# Patient Record
Sex: FEMALE | Race: ASIAN | NOT HISPANIC OR LATINO | Employment: PART TIME | URBAN - METROPOLITAN AREA
[De-identification: names, ages, dates, MRNs, and addresses within clinical notes are randomized per-mention and may not be internally consistent; named-entity substitution may affect disease eponyms.]

---

## 2022-06-21 ENCOUNTER — OFFICE VISIT (OUTPATIENT)
Dept: OBGYN | Facility: CLINIC | Age: 27
End: 2022-06-21
Payer: COMMERCIAL

## 2022-06-21 VITALS — HEART RATE: 68 BPM | WEIGHT: 195.2 LBS | SYSTOLIC BLOOD PRESSURE: 120 MMHG | DIASTOLIC BLOOD PRESSURE: 79 MMHG

## 2022-06-21 DIAGNOSIS — R87.610 ASCUS WITH POSITIVE HIGH RISK HPV CERVICAL: Primary | ICD-10-CM

## 2022-06-21 DIAGNOSIS — Z32.00 PREGNANCY EXAMINATION OR TEST, PREGNANCY UNCONFIRMED: ICD-10-CM

## 2022-06-21 DIAGNOSIS — R87.810 ASCUS WITH POSITIVE HIGH RISK HPV CERVICAL: Primary | ICD-10-CM

## 2022-06-21 LAB — HCG UR QL: NEGATIVE

## 2022-06-21 PROCEDURE — 81025 URINE PREGNANCY TEST: CPT | Performed by: OBSTETRICS & GYNECOLOGY

## 2022-06-21 PROCEDURE — 57454 BX/CURETT OF CERVIX W/SCOPE: CPT | Performed by: OBSTETRICS & GYNECOLOGY

## 2022-06-21 PROCEDURE — 88305 TISSUE EXAM BY PATHOLOGIST: CPT | Performed by: PATHOLOGY

## 2022-06-21 PROCEDURE — 88342 IMHCHEM/IMCYTCHM 1ST ANTB: CPT | Performed by: PATHOLOGY

## 2022-06-21 NOTE — PROGRESS NOTES
Subjective  Lance Disla is a 27 year old female here for a colposcopy.   Her last pap smear:  No results found for: PAP  Previous paps normal: yes as follows:  2/3/22 Other HR HPV positive, ASCUS at [facility]    Cervical risk factors:       Lifetime sexual partners: 5      Previous STD none      Previous dysplasia: no      Previous colposcopy: no.       Previous treatment:  none      Tobacco: No      Gardasil vaccination status: unknown  Current birth control: none  Patient's last menstrual period was 05/11/2022.  I discussed the history of HPV and the risk of dysplasia/cancer.  I explained the indications for the colposcopy and the procedure in detail.  I discussed the potential risks including bleeding, infection and cramping. I have recommend abstinence for 1 week and no vigorous activity for 48 hours.  Consent form was signed by patient and all questions were answered.    OBJECTIVE:  Vitals:    06/21/22 1444   BP: 120/79   Pulse: 68   Weight: 88.5 kg (195 lb 3.2 oz)      Patient alert, oriented, and in no acute distress. She was placed on the exam table in the dorsal position and a sterile speculum inserted into the vagina. The cervix was easily visualized.  Acetic acid was applied to better visualize the transformation zone.  Lugol's solution was then applied. Colposcopy was performed in the usual fashion.  One biopsy taken from the 1 o'clock position, and an ECC was performed. See procedure note and exam for further details.    ASSESSMENT:    Colposcopy of the cervix and upper/adjacent vagina with biopsy and ECC.       HPV OTher HR pos, ASCUS pap smear.    PLAN:   Will await pathology results, if CIN1, recommend Repeat pap in 12 months    Discussed symptoms to watch for, including bleeding through 1 pad or more per hour, heavy cramps or abdominal pain, fever, or purulent foul smelling discharge.  If these occur, then she will call or return for follow up.    Judy Reynolds, DO    Physical Exam   GYN:  Colposcopy/LEEP    Date/Time: 6/21/2022 2:55 PM  Performed by: Judy Reynolds DO  Authorized by: Judy Reynolds DO     Consent:     Consent obtained:  Written    Consent given by:  Patient    Procedural risks discussed:  Bleeding, damage to other organs, repeat procedure and infection    Patient questions answered: yes      Patient agrees, verbalizes understanding, and wants to proceed: yes      Educational handouts given: yes      Instructions and paperwork completed: yes    Universal protocol:     Patient states understanding of procedure being performed: yes      Relevant documents present and verified: yes      Test results available and properly labeled: yes      Required blood products, implants, devices, and special equipment available: yes    Pre-procedure:     Pre-procedure timeout performed: yes      Prepped with: acetic acid and Lugol    Indication:     Indication:  HPV and ASC-US    HPV Indications:  Other high risk  Procedure:     Procedure: Colposcopy w/ cervical biopsy and ECC      Under satisfactory analgesia the patient was prepped and draped in the dorsal lithotomy position: yes      Hollister speculum was placed in the vagina: yes      Under colposcopic examination the transition zone was seen in entirety: yes      Intracervical block was performed: no      Endocervix was curetted using a Kevorkian curette: yes      Tampon inserted: no      Monsel's solution was applied: yes      Biopsy(s): yes      Specimen to pathology: yes    Post-procedure:     Findings: Friable cervix and White epithelium      Impression: Low grade cervical dysplasia      Patient tolerance of procedure:  Patient tolerated the procedure well with no immediate complications

## 2022-06-21 NOTE — PATIENT INSTRUCTIONS
If you have any questions regarding your visit, Please contact your care team.    [employer] [facility]: [phone]      [facility] CLINIC HOURS TELEPHONE NUMBER   Judy Reynolds DO.    LYNNE Muñoz -Surgery Scheduler  Marti - Surgery Scheduler    BREE Ramesh, RN  BREE Long     Monday, Thursday  [place]  7am-3pm    Tuesday, Wednesday  [place]  7am-3pm    E/O Friday &   [place]    Typical Surgery Days: Thursday or Friday   [facility]  [address] [place] [address] [phone] Phone  [phone] Fax    [address] [address] [phone] Phone    Imaging [phone] Phone    [facility] Labor and Delivery:  [phone] Phone     **Surgeries** Our Surgery Schedulers will contact you to schedule. If you do not receive a call within 3 business days, please call [phone].    Urgent Care locations:  [facility] Saturday and    9 am - 5 pm    Monday-Friday   12 pm - 8 pm  Saturday and    9 am - 5 pm   [phone]       If you need a medication refill, please contact your pharmacy. Please allow 3 business days for your refill to be completed.  As always, Thank you for trusting us with your healthcare needs!

## 2022-06-24 LAB
PATH REPORT.COMMENTS IMP SPEC: NORMAL
PATH REPORT.FINAL DX SPEC: NORMAL
PATH REPORT.GROSS SPEC: NORMAL
PATH REPORT.MICROSCOPIC SPEC OTHER STN: NORMAL
PATH REPORT.MICROSCOPIC SPEC OTHER STN: NORMAL
PATH REPORT.RELEVANT HX SPEC: NORMAL
PHOTO IMAGE: NORMAL

## 2022-07-24 ENCOUNTER — HEALTH MAINTENANCE LETTER (OUTPATIENT)
Age: 27
End: 2022-07-24

## 2022-10-02 ENCOUNTER — HEALTH MAINTENANCE LETTER (OUTPATIENT)
Age: 27
End: 2022-10-02

## 2023-08-12 ENCOUNTER — HEALTH MAINTENANCE LETTER (OUTPATIENT)
Age: 28
End: 2023-08-12

## 2024-01-19 ENCOUNTER — VIRTUAL VISIT (OUTPATIENT)
Dept: FAMILY MEDICINE | Facility: CLINIC | Age: 29
End: 2024-01-19
Payer: COMMERCIAL

## 2024-01-19 DIAGNOSIS — N92.1 MENORRHAGIA WITH IRREGULAR CYCLE: Primary | ICD-10-CM

## 2024-01-19 PROCEDURE — 99203 OFFICE O/P NEW LOW 30 MIN: CPT | Mod: 95 | Performed by: NURSE PRACTITIONER

## 2024-01-19 ASSESSMENT — COLUMBIA-SUICIDE SEVERITY RATING SCALE - C-SSRS
1. WITHIN THE PAST MONTH, HAVE YOU WISHED YOU WERE DEAD OR WISHED YOU COULD GO TO SLEEP AND NOT WAKE UP?: NO
6. HAVE YOU EVER DONE ANYTHING, STARTED TO DO ANYTHING, OR PREPARED TO DO ANYTHING TO END YOUR LIFE?: NO
2. IN THE PAST MONTH, HAVE YOU ACTUALLY HAD ANY THOUGHTS OF KILLING YOURSELF?: NO

## 2024-01-19 ASSESSMENT — PATIENT HEALTH QUESTIONNAIRE - PHQ9
SUM OF ALL RESPONSES TO PHQ QUESTIONS 1-9: 15
10. IF YOU CHECKED OFF ANY PROBLEMS, HOW DIFFICULT HAVE THESE PROBLEMS MADE IT FOR YOU TO DO YOUR WORK, TAKE CARE OF THINGS AT HOME, OR GET ALONG WITH OTHER PEOPLE: SOMEWHAT DIFFICULT
SUM OF ALL RESPONSES TO PHQ QUESTIONS 1-9: 15

## 2024-01-19 NOTE — PROGRESS NOTES
Lance is a 28 year old who is being evaluated via a billable video visit.      How would you like to obtain your AVS? MyChart  If the video visit is dropped, the invitation should be resent by: Text to cell phone: [phone]  Will anyone else be joining your video visit? No      Assessment & Plan     Menorrhagia with irregular cycle  -Per chart review, patient was supposed to have colposcopy last year at [facility] due to having a positive HPV test on routine screening. Recommended she make an appointment with OBGYN for pap and pelvic exam due to abnormal uterine bleeding and to discuss fertility concerns.   -Schedule lab-only visit to have the following labs drawn:  - CBC with Platelets & Differential  - Ferritin  - Comprehensive metabolic panel  - Hemoglobin A1c  - TSH with free T4 reflex  - Lipid panel reflex to direct LDL Fasting  - Ob/Gyn  Referral  -Patient verbalized understanding and is agreement with plan of care.       Review of external notes as documented elsewhere in note  Review of the result(s) of each unique test - Pap, hpv  Ordering of each unique test        Depression Screening Follow Up        1/19/2024     7:47 AM   PHQ   PHQ-9 Total Score 15   Q9: Thoughts of better off dead/self-harm past 2 weeks More than half the days   F/U: Thoughts of suicide or self-harm Yes   F/U: Self harm-plan Yes   F/U: Self-harm action No   F/U: Safety concerns No         1/19/2024     7:47 AM   Last PHQ-9   1.  Little interest or pleasure in doing things 2   2.  Feeling down, depressed, or hopeless 2   3.  Trouble falling or staying asleep, or sleeping too much 3   4.  Feeling tired or having little energy 0   5.  Poor appetite or overeating 3   6.  Feeling bad about yourself 3   7.  Trouble concentrating 0   8.  Moving slowly or restless 0   Q9: Thoughts of better off dead/self-harm past 2 weeks 2   PHQ-9 Total Score 15   In the past two weeks have you had thoughts of suicide or self harm? Yes   Do  "you have concerns about your personal safety or the safety of others? No   In the past 2 weeks have you thought about a plan or had intention to harm yourself? Yes   In the past 2 weeks have you acted on these thoughts in any way? No             1/19/2024     4:14 PM   C-SSRS (Brief [place])   Within the last month, have you wished you were dead or wished you could go to sleep and not wake up? No   Within the last month, have you had any actual thoughts of killing yourself? No   Within the last month, have you ever done anything, started to do anything, or prepared to do anything to end your life? No         Follow Up        Follow Up Actions Taken  Patient to follow up with PCP.  Clinic staff to schedule appointment if able.    Discussed the following ways the patient can remain in a safe environment:  be around others  CONSULTATION/REFERRAL to OBGYN    Eliezer   Lance is a 28 year old, presenting for the following health issues:  Abnormal Bleeding Problem        1/19/2024     7:36 AM   Additional Questions   Roomed by Cy CLEMENT   Accompanied by Self     Abnormal Bleeding Problem    History of Present Illness       Reason for visit:  Irregular Menstrual cycles      Was on birth control [id number] for 3 years (Nexplanon). Since Nexplanon was removed she reports her periods have been very irregular, very heavy, soaking whole pad every couple hours with very large blood clots, bigger than quarter size. She reports her periods will sometimes skip 2 months and last any where from 2-7 days. She has been having some bleeding during and after intercourse as well. She states before she got Nexplanon her periods were regular every month, they were heavy but not as heavy as they are now.     Menarche: unsure, doesn't remember    Pregnant: has been pregnant 3 times, last pregnancy 2016. 2nd pregnancy was miscarriage. She has been trying to conceive since having Nexplanon removed.     Reports heigh and weight 5'4\" 206 lb    No " body acne  No facial hair or chest hair  Denies noticing darkening of the skin around her neck or under armpits      Menstrual Concern    Onset/Duration: Irregular menstrual cycles. Sometimes it goes on with heavy periods for 2 weeks and then wont have it for 2 months. Sometimes she misses work due to such  heavy periods because she is going through pads to quickly  Description:   Duration of bleeding episodes: Usually 6 or 7 days days  Frequency of periods: (1st day of one to 1st day of next):  N/A  Describe bleeding/flow:   Clots: YES- Large  Number of pads/day: 10        Cramping: mild and before  Accompanying Signs & Symptoms:  Lightheadedness: YES  Temperature intolerance: No  Nosebleeds/Easy bruising: No  Vaginal Discharge: YES- Prior to  Acne: No  Change in body hair: No  History:  Patient's last menstrual period was 01/02/2024 (exact date).  Previous normal periods: YES- ever since removal of nexplanon she has had irregular menstrual cycles  Contraceptive use: NO  Sexually active: YES  Any bleeding after intercourse: YES- Sometimes and usually after intercourse  Abnormal PAP Smears: YES  Precipitating or alleviating factors: Using tampons stops her menstrual cycle  Therapies tried and outcome: None        Review of Systems  Constitutional, HEENT, cardiovascular, pulmonary, GI, , musculoskeletal, neuro, skin, endocrine and psych systems are negative, except as otherwise noted.      Objective           Vitals:  No vitals were obtained today due to virtual visit.    Physical Exam   GENERAL: alert and no distress  EYES: Eyes grossly normal to inspection.  No discharge or erythema, or obvious scleral/conjunctival abnormalities.  RESP: No audible wheeze, cough, or visible cyanosis.    SKIN: Visible skin clear. No significant rash, abnormal pigmentation or lesions.  NEURO: Cranial nerves grossly intact.  Mentation and speech appropriate for age.  PSYCH: Appropriate affect, tone, and pace of words           Video-Visit Details    Type of service:  Video Visit   Video Start Time: 9:01 AM  Video End Time: 9:24    Originating Location (pt. Location): Home  Distant Location (provider location):  On-site  Platform used for Video Visit: Sary  Signed Electronically by: KELLY Dickinson CNP

## 2024-02-05 ENCOUNTER — OFFICE VISIT (OUTPATIENT)
Dept: OBGYN | Facility: CLINIC | Age: 29
End: 2024-02-05
Payer: COMMERCIAL

## 2024-02-05 VITALS — WEIGHT: 198.8 LBS | HEART RATE: 76 BPM | SYSTOLIC BLOOD PRESSURE: 114 MMHG | DIASTOLIC BLOOD PRESSURE: 75 MMHG

## 2024-02-05 DIAGNOSIS — N92.1 MENORRHAGIA WITH IRREGULAR CYCLE: ICD-10-CM

## 2024-02-05 DIAGNOSIS — R87.810 ASCUS WITH POSITIVE HIGH RISK HPV CERVICAL: Primary | ICD-10-CM

## 2024-02-05 DIAGNOSIS — R87.610 ASCUS WITH POSITIVE HIGH RISK HPV CERVICAL: Primary | ICD-10-CM

## 2024-02-05 DIAGNOSIS — N97.9 FEMALE INFERTILITY: ICD-10-CM

## 2024-02-05 LAB
ERYTHROCYTE [DISTWIDTH] IN BLOOD BY AUTOMATED COUNT: 16.8 % (ref 10–15)
HCT VFR BLD AUTO: 31.5 % (ref 35–47)
HGB BLD-MCNC: 9.2 G/DL (ref 11.7–15.7)
MCH RBC QN AUTO: 21.4 PG (ref 26.5–33)
MCHC RBC AUTO-ENTMCNC: 29.2 G/DL (ref 31.5–36.5)
MCV RBC AUTO: 73 FL (ref 78–100)
PLATELET # BLD AUTO: 277 10E3/UL (ref 150–450)
RBC # BLD AUTO: 4.3 10E6/UL (ref 3.8–5.2)
WBC # BLD AUTO: 7.5 10E3/UL (ref 4–11)

## 2024-02-05 PROCEDURE — 84403 ASSAY OF TOTAL TESTOSTERONE: CPT | Performed by: OBSTETRICS & GYNECOLOGY

## 2024-02-05 PROCEDURE — 84443 ASSAY THYROID STIM HORMONE: CPT | Performed by: OBSTETRICS & GYNECOLOGY

## 2024-02-05 PROCEDURE — 85027 COMPLETE CBC AUTOMATED: CPT | Performed by: OBSTETRICS & GYNECOLOGY

## 2024-02-05 PROCEDURE — 83001 ASSAY OF GONADOTROPIN (FSH): CPT | Performed by: OBSTETRICS & GYNECOLOGY

## 2024-02-05 PROCEDURE — G0145 SCR C/V CYTO,THINLAYER,RESCR: HCPCS | Performed by: OBSTETRICS & GYNECOLOGY

## 2024-02-05 PROCEDURE — 84146 ASSAY OF PROLACTIN: CPT | Performed by: OBSTETRICS & GYNECOLOGY

## 2024-02-05 PROCEDURE — 84702 CHORIONIC GONADOTROPIN TEST: CPT | Performed by: OBSTETRICS & GYNECOLOGY

## 2024-02-05 PROCEDURE — 84270 ASSAY OF SEX HORMONE GLOBUL: CPT | Performed by: OBSTETRICS & GYNECOLOGY

## 2024-02-05 PROCEDURE — 99215 OFFICE O/P EST HI 40 MIN: CPT | Performed by: OBSTETRICS & GYNECOLOGY

## 2024-02-05 PROCEDURE — 82670 ASSAY OF TOTAL ESTRADIOL: CPT | Performed by: OBSTETRICS & GYNECOLOGY

## 2024-02-05 PROCEDURE — 36415 COLL VENOUS BLD VENIPUNCTURE: CPT | Performed by: OBSTETRICS & GYNECOLOGY

## 2024-02-05 PROCEDURE — 80053 COMPREHEN METABOLIC PANEL: CPT | Performed by: OBSTETRICS & GYNECOLOGY

## 2024-02-05 PROCEDURE — 82166 ASSAY ANTI-MULLERIAN HORM: CPT | Performed by: OBSTETRICS & GYNECOLOGY

## 2024-02-05 PROCEDURE — 82627 DEHYDROEPIANDROSTERONE: CPT | Performed by: OBSTETRICS & GYNECOLOGY

## 2024-02-05 PROCEDURE — 84144 ASSAY OF PROGESTERONE: CPT | Performed by: OBSTETRICS & GYNECOLOGY

## 2024-02-05 PROCEDURE — 83002 ASSAY OF GONADOTROPIN (LH): CPT | Performed by: OBSTETRICS & GYNECOLOGY

## 2024-02-05 PROCEDURE — G0124 SCREEN C/V THIN LAYER BY MD: HCPCS | Performed by: PATHOLOGY

## 2024-02-05 PROCEDURE — 87624 HPV HI-RISK TYP POOLED RSLT: CPT | Performed by: OBSTETRICS & GYNECOLOGY

## 2024-02-05 NOTE — PROGRESS NOTES
Lance is a 28 year old  referred here by Tiffany Rossi APRN CNP  for consultation regarding Pap smear and pelvic exam, infertility and irregular heavy..  Patient's Pap history significant for colposcopy in the past and repeat Pap smear about a year ago that showed atypical squamous cells of undetermined significance with high risk HPV.  She was also referred regarding infertility for 3 to 5 years with irregular heavy.  She says her menses are irregular and sometimes she misses 1 to 2 months and then becomes very heavy.  Her last child was born in 2016 subsequent to that she had the Nexplanon placed.  This was removed in 2019 and since then she has not been able to get pregnant with her .  This is when she developed the irregular menses as described above..  Her last menstrual period was 2024  ROS: Ten point review of systems was reviewed and negative except the above.    Gyne: +abn pap (last pap ),     History reviewed. No pertinent past medical history.  History reviewed. No pertinent surgical history.  There is no problem list on file for this patient.      ALL/Meds: Her medication and allergy histories were reviewed and are documented in their appropriate chart areas.    SH: - tob, - EtOH,     FH: Her family history was reviewed and documented in its appropriate chart area.    PE: /75 (BP Location: Left arm, Patient Position: Sitting, Cuff Size: Adult Large)   Pulse 76   Wt 90.2 kg (198 lb 12.8 oz)   LMP 2024 (Exact Date)   There is no height or weight on file to calculate BMI.    General Appearance:  healthy, alert, active, no distress  HEENT: NCAT  Abdomen: Soft, nontender.  Normal bowel sounds.  No masses  Pelvic:       - Ext: NEFG,        - Urethra: no lesions, no masses, no hypermobility       - Urethral Meatus: normal appearance,        - Bladder: no tenderness, no masses       - Vagina: pink, moist, normal rugae, no lesions, no discharge       - Cervix: no  lesions, parous       - Uterus: normal sized, AV/, mobile, n we will discuss further about her infertility based on her labs ormal contour       - Adnexa: no masses, no tenderness       - Rectal: deferred, normal tone, negative guaic       - Pelvic support: no cystocele, no rectocele, no uterine prolapse    A/P    ICD-10-CM    1. ASCUS with positive high risk HPV cervical  R87.610 US Pelvic Transabdominal and Transvaginal    R87.810 Pap imaged thin layer screen with HPV - recommended age 30 - 65 years (select HPV order below)      2. Menorrhagia with irregular cycle  N92.1 Ob/Gyn  Referral     US Pelvic Transabdominal and Transvaginal     Pap imaged thin layer screen with HPV - recommended age 30 - 65 years (select HPV order below)      3. Female infertility  N97.9 Follicle stimulating hormone     Luteinizing Hormone     TSH     Progesterone     Estradiol     Testosterone Free and Total     DHEA sulfate     Prolactin     Comprehensive metabolic panel     CBC with platelets     hCG Quantitative Pregnancy     Anti-Mullerian hormone        We discussed possible anovulation which should be further explored after her test results are back..  If her Pap smear is abnormal she will require a colposcopy to be done..      Total time preparing to see patient with reviewing prior encounter and labs, face to face time,  and coordinating care on the same calendar date: 40 mins    CEPHAS AGBEH, MD.

## 2024-02-05 NOTE — PATIENT INSTRUCTIONS
To Schedule an Appointment 24/7  Call: [phone]    If you have any questions regarding your visit, Please contact your care team.  [employer] [facility]: [phone]  [facility] HOURS TELEPHONE NUMBER   Cephas Agbeh, M.D.      Gem Winston-Surgery Scheduler  Marti-Surgery Scheduler       Monday - [place]:    8:00 am-4:45 pm  Tuesday - [place]:   8:00 am-4:45 pm  Friday-[place]:       8:00 am-4:45 pm  Typical Surgery Day:  [facility]   [address] [place] [address]   [phone]   Fax [phone]    Imaging Scheduling all locations  [phone]      [facility] Labor and Delivery   [address] [place] [address] [place] [address]   [phone]    [facility]  [address] [facility] [address]  [phone]  Fax [phone]   Urgent Care locations:  [facility] Monday-Friday                               10 am - 8 pm  Saturday and Sunday                      9 am - 5 pm  Monday-Friday                              10 am- 8 pm  Saturday and Sunday                      9 am - 5 pm    [phone]   **Surgeries** Our Surgery Schedulers will contact you to schedule. If you do not receive a call within 3 business days, please call [phone].  If you need a medication refill, please contact your pharmacy. Please allow 3 business days for your refill to be completed.  As always, Thank you for trusting us with your healthcare needs!  see additional instructions from your care team below

## 2024-02-06 ENCOUNTER — ANCILLARY PROCEDURE (OUTPATIENT)
Dept: ULTRASOUND IMAGING | Facility: CLINIC | Age: 29
End: 2024-02-06
Attending: OBSTETRICS & GYNECOLOGY
Payer: COMMERCIAL

## 2024-02-06 DIAGNOSIS — D50.8 OTHER IRON DEFICIENCY ANEMIA: Primary | ICD-10-CM

## 2024-02-06 DIAGNOSIS — N92.1 MENORRHAGIA WITH IRREGULAR CYCLE: ICD-10-CM

## 2024-02-06 LAB
ALBUMIN SERPL BCG-MCNC: 4.2 G/DL (ref 3.5–5.2)
ALP SERPL-CCNC: 62 U/L (ref 40–150)
ALT SERPL W P-5'-P-CCNC: 14 U/L (ref 0–50)
ANION GAP SERPL CALCULATED.3IONS-SCNC: 10 MMOL/L (ref 7–15)
AST SERPL W P-5'-P-CCNC: 17 U/L (ref 0–45)
BILIRUB SERPL-MCNC: 0.2 MG/DL
BUN SERPL-MCNC: 10.2 MG/DL (ref 6–20)
CALCIUM SERPL-MCNC: 9.2 MG/DL (ref 8.6–10)
CHLORIDE SERPL-SCNC: 105 MMOL/L (ref 98–107)
CREAT SERPL-MCNC: 0.7 MG/DL (ref 0.51–0.95)
DEPRECATED HCO3 PLAS-SCNC: 24 MMOL/L (ref 22–29)
DHEA-S SERPL-MCNC: 76 UG/DL (ref 35–430)
EGFRCR SERPLBLD CKD-EPI 2021: >90 ML/MIN/1.73M2
ESTRADIOL SERPL-MCNC: 222 PG/ML
FSH SERPL IRP2-ACNC: 2.3 MIU/ML
GLUCOSE SERPL-MCNC: 84 MG/DL (ref 70–99)
HCG INTACT+B SERPL-ACNC: <1 MIU/ML
LH SERPL-ACNC: 2.2 MIU/ML
MIS SERPL-MCNC: 0.83 NG/ML (ref 0.89–9.9)
POTASSIUM SERPL-SCNC: 3.8 MMOL/L (ref 3.4–5.3)
PROGEST SERPL-MCNC: 0.1 NG/ML
PROLACTIN SERPL 3RD IS-MCNC: 19 NG/ML (ref 5–23)
PROT SERPL-MCNC: 7.8 G/DL (ref 6.4–8.3)
SHBG SERPL-SCNC: 112 NMOL/L (ref 30–135)
SODIUM SERPL-SCNC: 139 MMOL/L (ref 135–145)
TSH SERPL DL<=0.005 MIU/L-ACNC: 4.07 UIU/ML (ref 0.3–4.2)

## 2024-02-06 PROCEDURE — 76830 TRANSVAGINAL US NON-OB: CPT | Mod: TC | Performed by: RADIOLOGY

## 2024-02-06 PROCEDURE — 76856 US EXAM PELVIC COMPLETE: CPT | Mod: TC | Performed by: RADIOLOGY

## 2024-02-06 RX ORDER — FERROUS SULFATE 325(65) MG
325 TABLET ORAL 2 TIMES DAILY
Qty: 120 TABLET | Refills: 1 | Status: SHIPPED | OUTPATIENT
Start: 2024-02-06

## 2024-02-08 LAB
BKR LAB AP GYN ADEQUACY: ABNORMAL
BKR LAB AP GYN INTERPRETATION: ABNORMAL
BKR LAB AP HPV REFLEX: ABNORMAL
BKR LAB AP PREVIOUS ABNL DX: ABNORMAL
BKR LAB AP PREVIOUS ABNORMAL: ABNORMAL
PATH REPORT.COMMENTS IMP SPEC: ABNORMAL
PATH REPORT.COMMENTS IMP SPEC: ABNORMAL
PATH REPORT.RELEVANT HX SPEC: ABNORMAL
TESTOST FREE SERPL-MCNC: 0.13 NG/DL
TESTOST SERPL-MCNC: 17 NG/DL (ref 8–60)

## 2024-02-09 ENCOUNTER — PATIENT OUTREACH (OUTPATIENT)
Dept: OBGYN | Facility: CLINIC | Age: 29
End: 2024-02-09
Payer: COMMERCIAL

## 2024-02-09 ENCOUNTER — TELEPHONE (OUTPATIENT)
Dept: OBGYN | Facility: CLINIC | Age: 29
End: 2024-02-09
Payer: COMMERCIAL

## 2024-02-09 DIAGNOSIS — R87.611 ATYPICAL SQUAMOUS CELLS CANNOT EXCLUDE HIGH GRADE SQUAMOUS INTRAEPITHELIAL LESION ON CYTOLOGIC SMEAR OF CERVIX (ASC-H): Primary | ICD-10-CM

## 2024-02-09 LAB
HUMAN PAPILLOMA VIRUS 16 DNA: NEGATIVE
HUMAN PAPILLOMA VIRUS 18 DNA: NEGATIVE
HUMAN PAPILLOMA VIRUS FINAL DIAGNOSIS: ABNORMAL
HUMAN PAPILLOMA VIRUS OTHER HR: POSITIVE

## 2024-02-09 NOTE — TELEPHONE ENCOUNTER
[facility] Call Center    Phone Message    May a detailed message be left on voicemail: yes     Reason for Call: Other: Pt is needing to schedule a Colposcopy at the [place] [facility] location, writer contacted secure chat and was unable to get a hold of . Please call back at # [phone].      Action Taken: Message routed to:  Other: [place] [facility]    Travel Screening: Not Applicable

## 2024-03-04 NOTE — PATIENT INSTRUCTIONS
If you have any questions regarding your visit, Please contact your care team.    [employer] [facility]: [phone]      [facility] CLINIC HOURS TELEPHONE NUMBER   Judy Reynolds DO.    LYNNE Muñoz-Surgery Scheduler  Marti - Surgery Scheduler    BREE Ramesh RN Kylie, RN     Monday, Thursday  [place]  7am-3pm    Tuesday, Wednesday  [place]  7am-3pm    Friday  [place]  1pm-3:30pm    Typical Surgery Days: Thursday or Friday   [facility]  [address] [place] [address] [phone] Phone  [phone] Fax    [facility]  [address] [place] [address] [phone] Phone    Imaging [phone] Phone    [facility] Labor and Delivery:  [phone] Phone     **Surgeries** Our Surgery Schedulers will contact you to schedule. If you do not receive a call within 3 business days, please call [phone].    Urgent Care locations:  [facility] Saturday and    9 am - 5 pm    Monday-Friday   12 pm - 8 pm  Saturday and    9 am - 5 pm   [phone]       If you need a medication refill, please contact your pharmacy. Please allow 3 business days for your refill to be completed.  As always, Thank you for trusting us with your healthcare needs!

## 2024-03-05 ENCOUNTER — OFFICE VISIT (OUTPATIENT)
Dept: OBGYN | Facility: CLINIC | Age: 29
End: 2024-03-05
Payer: COMMERCIAL

## 2024-03-05 VITALS — WEIGHT: 194 LBS | SYSTOLIC BLOOD PRESSURE: 120 MMHG | HEART RATE: 112 BPM | DIASTOLIC BLOOD PRESSURE: 77 MMHG

## 2024-03-05 DIAGNOSIS — Z32.00 PREGNANCY EXAMINATION OR TEST, PREGNANCY UNCONFIRMED: ICD-10-CM

## 2024-03-05 DIAGNOSIS — R87.611 ATYPICAL SQUAMOUS CELLS CANNOT EXCLUDE HIGH GRADE SQUAMOUS INTRAEPITHELIAL LESION ON CYTOLOGIC SMEAR OF CERVIX (ASC-H): Primary | ICD-10-CM

## 2024-03-05 LAB — HCG UR QL: NEGATIVE

## 2024-03-05 PROCEDURE — 88305 TISSUE EXAM BY PATHOLOGIST: CPT | Performed by: STUDENT IN AN ORGANIZED HEALTH CARE EDUCATION/TRAINING PROGRAM

## 2024-03-05 PROCEDURE — 81025 URINE PREGNANCY TEST: CPT | Performed by: OBSTETRICS & GYNECOLOGY

## 2024-03-05 PROCEDURE — 57454 BX/CURETT OF CERVIX W/SCOPE: CPT | Performed by: OBSTETRICS & GYNECOLOGY

## 2024-03-05 PROCEDURE — 88342 IMHCHEM/IMCYTCHM 1ST ANTB: CPT | Performed by: STUDENT IN AN ORGANIZED HEALTH CARE EDUCATION/TRAINING PROGRAM

## 2024-03-05 NOTE — PROGRESS NOTES
Subjective  Lance Disla is a 28 year old female here for a colposcopy.    Her last pap smear:    2/5/24 ASC-H and LSIL, +HR HPV (not 16/18). Plan: colposcopy due before 5/5/24     Cervical risk factors:       Previous STD none      Previous dysplasia: no      Previous colposcopy: yes:  date unk.       Previous treatment:  none       Tobacco: Yes vaping      Gardasil vaccination status:No  Current birth control: condom  Patient's last menstrual period was 01/02/2024 (exact date).  I discussed the history of HPV and the risk of dysplasia/cancer.  I explained the indications for the colposcopy and the procedure in detail.  I discussed the potential risks including bleeding, infection and cramping. I have recommend abstinence for 1 week and no vigorous activity for 48 hours.  Consent form was signed by patient and all questions were answered.    OBJECTIVE:  Vitals:    03/05/24 1342   BP: 120/77   Pulse: 112   Weight: 88 kg (194 lb)      Patient alert, oriented, and in no acute distress. She was placed on the exam table in the dorsal position and a sterile speculum inserted into the vagina. The cervix was easily visualized.  Acetic acid was applied to better visualize the transformation zone.  Lugol's solution was then applied. Colposcopy was performed in the usual fashion.  Multiple biopsies taken from 2 and 7o'clock positions, and an ECC was performed. See procedure note and exam for further details.    ASSESSMENT:    Colposcopy of the cervix and upper/adjacent vagina with biopsy and ECC.       Asc-h, +hr hpv other pap smear.    PLAN:   Will await pathology results, if CIN2-3 recommend LEEP under sedation    Discussed symptoms to watch for, including bleeding through 1 pad or more per hour, heavy cramps or abdominal pain, fever, or purulent foul smelling discharge.  If these occur, then she will call or return for follow up.    Judy Reynolds, DO    Physical Exam   GYN: Colposcopy/LEEP    Date/Time: 3/5/2024 1:55  PM    Performed by: Judy Reynolds DO  Authorized by: Judy Reynolds DO    Consent:     Consent obtained:  Written    Consent given by:  Patient    Procedural risks discussed:  Bleeding, damage to other organs, repeat procedure and infection    Patient questions answered: yes      Patient agrees, verbalizes understanding, and wants to proceed: yes      Educational handouts given: yes      Instructions and paperwork completed: yes    Universal protocol:     Patient states understanding of procedure being performed: yes      Relevant documents present and verified: yes      Test results available and properly labeled: yes      Required blood products, implants, devices, and special equipment available: yes    Pre-procedure:     Pre-procedure timeout performed: yes      Prepped with: acetic acid and Lugol    Indication:     Indication:  HPV and ASC-H    HPV Indications:  Other high risk  Procedure:     Procedure: Colposcopy w/ cervical biopsy and ECC      Under satisfactory analgesia the patient was prepped and draped in the dorsal lithotomy position: yes      Steward speculum was placed in the vagina: yes      Under colposcopic examination the transition zone was seen in entirety: yes      Endocervix was curetted using a Kevorkian curette: yes      Cervical biopsy performed with a cervical biopsy punch: yes      Monsel's solution was applied: yes      Biopsy(s): yes      Specimen to pathology: yes    Post-procedure:     Findings: Friable cervix, Punctation and White epithelium      Patient tolerance of procedure:  Patient tolerated the procedure well with no immediate complications

## 2024-03-08 LAB
PATH REPORT.COMMENTS IMP SPEC: NORMAL
PATH REPORT.COMMENTS IMP SPEC: NORMAL
PATH REPORT.FINAL DX SPEC: NORMAL
PATH REPORT.GROSS SPEC: NORMAL
PATH REPORT.MICROSCOPIC SPEC OTHER STN: NORMAL
PATH REPORT.RELEVANT HX SPEC: NORMAL
PHOTO IMAGE: NORMAL

## 2024-03-14 ENCOUNTER — VIRTUAL VISIT (OUTPATIENT)
Dept: OBGYN | Facility: CLINIC | Age: 29
End: 2024-03-14
Payer: COMMERCIAL

## 2024-03-14 DIAGNOSIS — N87.1 CIN II (CERVICAL INTRAEPITHELIAL NEOPLASIA II): Primary | ICD-10-CM

## 2024-03-14 PROCEDURE — 99213 OFFICE O/P EST LOW 20 MIN: CPT | Mod: 93 | Performed by: OBSTETRICS & GYNECOLOGY

## 2024-03-14 NOTE — PROGRESS NOTES
Lance is a 28 year old who is being evaluated via a billable telephone visit.    What phone number would you like to be contacted at? [phone]  Originating Location (pt. Location): Home  Distant Location (provider location):  Off-site        Phone call duration: 7 minutes  Signed Electronically by: Judy Reynolds DO        SUBJECTIVE:       HPI: Lance Disla is a 28 year old  who presents today for VV to discus colposcopy results.     Pap history:  3/5/24 [facility] Biopsy BETH II, Neg ECC  24 ASC-H and LSIL, +HR HPV (not 16/18). Plan: colposcopy due before 24    Ob Hx:  s/p   OB History    Para Term  AB Living   3 2 2 0 1 2   SAB IAB Ectopic Multiple Live Births   1 0 0 0 2      # Outcome Date GA Lbr Peng/2nd Weight Sex Delivery Anes PTL Lv   3 Term 16 39w5d   F Vag-Spont   BALWINDER      Name: Lisa      Apgar1: 8  Apgar5: 9   2 SAB 10/2014        ND   1 Term 13 41w0d 12:00 3.612 kg (7 lb 15.4 oz) M    BALWINDER      Birth Comments: ? coccyx fx      Name: amanda      Apgar1: 9  Apgar5: 9    .         reports that she has never smoked. She has never used smokeless tobacco.      Today's PHQ-2 Score:       2024     7:47 AM   PHQ-2 (  [employer])   Q1: Little interest or pleasure in doing things 2   Q2: Feeling down, depressed or hopeless 2   PHQ-2 Score 4   Q1: Little interest or pleasure in doing things More than half the days   Q2: Feeling down, depressed or hopeless More than half the days   PHQ-2 Score 4     Today's PHQ-9 Score:       2024     7:47 AM   PHQ-9 SCORE   PHQ-9 Total Score MyChart 15 (Moderately severe depression)   PHQ-9 Total Score 15     Today's AARON-7 Score:        No data to display                Problem list and histories reviewed & adjusted, as indicated.  Additional history: as documented.    Patient Active Problem List   Diagnosis     Atypical squamous cells cannot exclude high grade squamous intraepithelial lesion on cytologic smear of cervix (ASC-H)      No past surgical history on file.   Social History     Tobacco Use     Smoking status: Never     Smokeless tobacco: Never   Substance Use Topics     Alcohol use: Not Currently      No data available            ferrous sulfate (FEROSUL) 325 (65 Fe) MG tablet, Take 1 tablet (325 mg) by mouth 2 times daily  PREN-FE-METH-FA-OMEG W/O A PO,     No current facility-administered medications on file prior to visit.    No Known Allergies    ROS:  10 Point review of systems negative other noted above in HPI    OBJECTIVE:     LMP 2024 (Exact Date)   There is no height or weight on file to calculate BMI.        GENERAL: healthy, alert and no distress  RESP: no audible wheeze, cough.  Able to speak fully in complete sentences.  NEURO: Cranial nerves grossly intact, mentation intact and speech normal  PSYCH: mentation appears normal, affect normal/bright, judgement and insight intact, normal speech      In-Clinic Test Results:  No results found for this or any previous visit (from the past 24 hour(s)).    ASSESSMENT/PLAN:                                                      Lance Disla is a 28 year old  who presents today for VV to discuss colposcopy results      ICD-10-CM    1. BETH II (cervical intraepithelial neoplasia II)  N87.1           BETH II.  ASCCP guidelines reviewed, including expectant management vs LEEP.   Patient does desire future fertility, currently trying to concieve.  After discussing RBA, patient requesting to talk about her options her  further and will let us know what they would like to do.       Judy Reynolds, DO  [facility] [place]

## 2024-03-18 ENCOUNTER — TELEPHONE (OUTPATIENT)
Dept: OBGYN | Facility: CLINIC | Age: 29
End: 2024-03-18
Payer: COMMERCIAL

## 2024-03-18 ENCOUNTER — PREP FOR PROCEDURE (OUTPATIENT)
Dept: OBGYN | Facility: CLINIC | Age: 29
End: 2024-03-18
Payer: COMMERCIAL

## 2024-03-18 DIAGNOSIS — N87.1 CIN II (CERVICAL INTRAEPITHELIAL NEOPLASIA II): Primary | ICD-10-CM

## 2024-03-18 NOTE — TELEPHONE ENCOUNTER
Associated Diagnoses    BETH II (cervical intraepithelial neoplasia II) [N87.1]  - Primary        Source Order Set    Order Set Name Order ID    [id number]     Case Request: Case Info    Panel 1    Providers    Provider Role Service   Judy Reynolds DO Primary      Procedures    Procedure Laterality Anesthesia Region   CONE BIOPSY, CERVIX, USING LOOP ELECTROSURGICAL EXCISION PROCEDURE (LEEP) N/A MAC                   Requested date:   Location:  OR   Patient class:      Pre-op diagnoses: BETH II (cervical intraepithelial neoplasia II)     Scheduling Instructions    Additional Instructions for the Case  Surgical Assistant: No  Multi Surgeon Case No  CSC okay No  H&P:  Pre-op options: Primary care provider/Viki/Kitty  Post-op:  4 weeks  Sterilization consent:  Not applicable to procedure being performed.  Vendor: No  Surgical time needed: Average  ERAS patient: Yes  If scheduling for D&E does Laminaria placement: No     SURGERY SCHEDULING AND PRECERTIFICATION    Medical Record Number: [id number]  Lance Disla  YOB: 1995   Phone: [phone] (home)   Primary Provider: No Ref-Primary, Physician    Reason for Admit:  ICD-10 CODE:    BETH II (cervical intraepithelial neoplasia II) [N87.1]  - Primary          Surgeon: Judy Reynolds DO  Surgical Procedure: CONE BIOPSY, CERVIX, USING LOOP ELECTROSURGICAL EXCISION PROCEDURE (LEEP)    Date of Surgery 4/18 Time of Surgery 2:40 p.m.  Surgery to be performed at:  [facility]   Status: Outpatient  Type of Anesthesia Anticipated: MAC    Sterilization consent:  Not applicable to procedure being performed.    Pre-Op: On 4/8 with Tiffany Clarke at 11:10 a.m.  COVID testing:  Per Provider's discretion Covid testing is not indicated.     Post-Op:  4 weeks on 5/15 with Dr Reynolds at 8:30 a.m.    Pre-certification routed to Financial Counselors:  Auto routes via Case Request    Surgery packet mailed to patient's home address: Yes   Patient instructed NPO 12 hours prior to surgery, arrive according to the time the nurse gives patient when called prior to surgery, must have a .  Patient understood and agrees to the plan.      Requestor:  Chikis Chandler     Location:  [facility] [phone]

## 2024-04-05 ENCOUNTER — PATIENT OUTREACH (OUTPATIENT)
Dept: OBGYN | Facility: CLINIC | Age: 29
End: 2024-04-05
Payer: COMMERCIAL

## 2024-04-08 ENCOUNTER — OFFICE VISIT (OUTPATIENT)
Dept: FAMILY MEDICINE | Facility: CLINIC | Age: 29
End: 2024-04-08
Payer: COMMERCIAL

## 2024-04-08 VITALS
HEART RATE: 85 BPM | HEIGHT: 64 IN | DIASTOLIC BLOOD PRESSURE: 65 MMHG | TEMPERATURE: 97.4 F | OXYGEN SATURATION: 100 % | RESPIRATION RATE: 20 BRPM | BODY MASS INDEX: 33.9 KG/M2 | WEIGHT: 198.6 LBS | SYSTOLIC BLOOD PRESSURE: 105 MMHG

## 2024-04-08 DIAGNOSIS — N92.1 MENORRHAGIA WITH IRREGULAR CYCLE: ICD-10-CM

## 2024-04-08 DIAGNOSIS — D50.0 IRON DEFICIENCY ANEMIA DUE TO CHRONIC BLOOD LOSS: ICD-10-CM

## 2024-04-08 DIAGNOSIS — Z01.818 PREOP GENERAL PHYSICAL EXAM: Primary | ICD-10-CM

## 2024-04-08 DIAGNOSIS — N87.1 CIN II (CERVICAL INTRAEPITHELIAL NEOPLASIA II): ICD-10-CM

## 2024-04-08 LAB
BASOPHILS # BLD AUTO: 0 10E3/UL (ref 0–0.2)
BASOPHILS NFR BLD AUTO: 1 %
EOSINOPHIL # BLD AUTO: 0.3 10E3/UL (ref 0–0.7)
EOSINOPHIL NFR BLD AUTO: 3 %
ERYTHROCYTE [DISTWIDTH] IN BLOOD BY AUTOMATED COUNT: 23.5 % (ref 10–15)
HBA1C MFR BLD: 4.8 % (ref 0–5.6)
HCT VFR BLD AUTO: 36.3 % (ref 35–47)
HGB BLD-MCNC: 11 G/DL (ref 11.7–15.7)
IMM GRANULOCYTES # BLD: 0 10E3/UL
IMM GRANULOCYTES NFR BLD: 1 %
LYMPHOCYTES # BLD AUTO: 2.1 10E3/UL (ref 0.8–5.3)
LYMPHOCYTES NFR BLD AUTO: 28 %
MCH RBC QN AUTO: 24.4 PG (ref 26.5–33)
MCHC RBC AUTO-ENTMCNC: 30.3 G/DL (ref 31.5–36.5)
MCV RBC AUTO: 81 FL (ref 78–100)
MONOCYTES # BLD AUTO: 0.5 10E3/UL (ref 0–1.3)
MONOCYTES NFR BLD AUTO: 6 %
NEUTROPHILS # BLD AUTO: 4.7 10E3/UL (ref 1.6–8.3)
NEUTROPHILS NFR BLD AUTO: 62 %
NRBC # BLD AUTO: 0 10E3/UL
NRBC BLD AUTO-RTO: 0 /100
PLATELET # BLD AUTO: 210 10E3/UL (ref 150–450)
RBC # BLD AUTO: 4.5 10E6/UL (ref 3.8–5.2)
WBC # BLD AUTO: 7.7 10E3/UL (ref 4–11)

## 2024-04-08 PROCEDURE — 90471 IMMUNIZATION ADMIN: CPT | Performed by: NURSE PRACTITIONER

## 2024-04-08 PROCEDURE — 80061 LIPID PANEL: CPT | Performed by: NURSE PRACTITIONER

## 2024-04-08 PROCEDURE — 82728 ASSAY OF FERRITIN: CPT | Performed by: NURSE PRACTITIONER

## 2024-04-08 PROCEDURE — 83540 ASSAY OF IRON: CPT | Performed by: NURSE PRACTITIONER

## 2024-04-08 PROCEDURE — 99214 OFFICE O/P EST MOD 30 MIN: CPT | Mod: 25 | Performed by: NURSE PRACTITIONER

## 2024-04-08 PROCEDURE — 83036 HEMOGLOBIN GLYCOSYLATED A1C: CPT | Performed by: NURSE PRACTITIONER

## 2024-04-08 PROCEDURE — 85025 COMPLETE CBC W/AUTO DIFF WBC: CPT | Performed by: NURSE PRACTITIONER

## 2024-04-08 PROCEDURE — 36415 COLL VENOUS BLD VENIPUNCTURE: CPT | Performed by: NURSE PRACTITIONER

## 2024-04-08 PROCEDURE — 83550 IRON BINDING TEST: CPT | Performed by: NURSE PRACTITIONER

## 2024-04-08 PROCEDURE — 91320 SARSCV2 VAC 30MCG TRS-SUC IM: CPT | Performed by: NURSE PRACTITIONER

## 2024-04-08 PROCEDURE — 80053 COMPREHEN METABOLIC PANEL: CPT | Performed by: NURSE PRACTITIONER

## 2024-04-08 PROCEDURE — 90480 ADMN SARSCOV2 VAC 1/ONLY CMP: CPT | Performed by: NURSE PRACTITIONER

## 2024-04-08 PROCEDURE — 84443 ASSAY THYROID STIM HORMONE: CPT | Performed by: NURSE PRACTITIONER

## 2024-04-08 PROCEDURE — 90686 IIV4 VACC NO PRSV 0.5 ML IM: CPT | Performed by: NURSE PRACTITIONER

## 2024-04-08 ASSESSMENT — PAIN SCALES - GENERAL: PAINLEVEL: NO PAIN (0)

## 2024-04-08 NOTE — PROGRESS NOTES
/Preoperative Evaluation  [facility]  [address] [facility] [address]  Phone: [phone]  Primary Provider: No Ref-Primary, Physician  Pre-op Performing Provider: HELLEN HECK  Apr 8, 2024       Lance is a 28 year old, presenting for the following:  Pre-Op Exam        4/8/2024     3:27 PM   Additional Questions   Roomed by Megan TRINIDAD   Accompanied by self     Surgical Information  Surgery/Procedure: CONE BIOPSY, CERVIX, USING LOOP ELECTROSURGICAL EXCISION PROCEDURE (LEEP)   Surgery Location: [facility]  Surgeon: Dr. Judy Reynolds  Surgery Date: 04/18/2024  Time of Surgery: 235PM  Where patient plans to recover: At home with family  Fax number for surgical facility: Note does not need to be faxed, will be available electronically in Epic.    Assessment & Plan     The proposed surgical procedure is considered LOW risk.    Preop general physical exam      BETH II (cervical intraepithelial neoplasia II)  -with plans for LEEP/cervical cone biopsy    Iron deficiency anemia due to chronic blood loss  -Improving on oral iron supplementation, Hgb up to 11.1   - CBC with platelets; Future  - Ferritin; Future  - Iron and iron binding capacity; Future          Risks and Recommendations  The patient has the following additional risks and recommendations for perioperative complications:  Anemia/Bleeding/Clotting:    - Anemia and does not require treatment prior to surgery. Monitor hemoglobin postoperatively if concerns for excessive bleeding or blood loss intra-op.     Antiplatelet or Anticoagulation Medication Instructions   - Patient is on no antiplatelet or anticoagulation medications.    Additional Medication Instructions  -Hold other medications day of surgery.     Recommendation  APPROVAL GIVEN to proceed with proposed procedure, without further diagnostic evaluation.    Ordering of each unique test      Subjective       HPI related to upcoming procedure: Has been having  heavy irregular periods and issues with fertility. Pap on 2/5/2024 postive for HPV and ASCUS, colposcopy 3/5/2025 showed CIN2, plan for LEEP and cone biopsy 4/18/2024.         4/8/2024     3:27 PM   Preop Questions   1. Have you ever had a heart attack or stroke? No   2. Have you ever had surgery on your heart or blood vessels, such as a stent placement, a coronary artery bypass, or surgery on an artery in your head, neck, heart, or legs? No   3. Do you have chest pain with activity? No   4. Do you have a history of  heart failure? No   5. Do you currently have a cold, bronchitis or symptoms of other infection? No   6. Do you have a cough, shortness of breath, or wheezing? No   7. Do you or anyone in your family have previous history of blood clots? No   8. Do you or does anyone in your family have a serious bleeding problem such as prolonged bleeding following surgeries or cuts? No   9. Have you ever had problems with anemia or been told to take iron pills? YES - taking iron supplements intermittenly   10. Have you had any abnormal blood loss such as black, tarry or bloody stools, or abnormal vaginal bleeding? No   11. Have you ever had a blood transfusion? No   12. Are you willing to have a blood transfusion if it is medically needed before, during, or after your surgery? Yes   13. Have you or any of your relatives ever had problems with anesthesia? No   14. Do you have sleep apnea, excessive snoring or daytime drowsiness? No   15. Do you have any artifical heart valves or other implanted medical devices like a pacemaker, defibrillator, or continuous glucose monitor? No   16. Do you have artificial joints? No   17. Are you allergic to latex? No   18. Is there any chance that you may be pregnant? No       Health Care Directive  Patient does not have a Health Care Directive or Living Will: Discussed advance care planning with patient; information given to patient to review.    Preoperative Review of     reviewed - no record of controlled substances prescribed.      Status of Chronic Conditions:  ANEMIA - Patient has a recent history of mild-moderate anemia, which has been symptomatic. Work up to date has revealed microcytic anemia, most likely secondary to iron deficiency due to heavy menstruation. Treatment has been oral iron supplementation.     Patient Active Problem List    Diagnosis Date Noted    BETH II (cervical intraepithelial neoplasia II) 03/18/2024     Priority: Medium    Atypical squamous cells cannot exclude high grade squamous intraepithelial lesion on cytologic smear of cervix (ASC-H) 02/05/2024     Priority: Medium     7/13/15 NIL per CE  2/3/22 ASCUS, +HR HPV (not 16/18) per CE  6/21/22 Colpo one bx neg, one bx with scant fragment of non gradable dysplasia  2/17/23 ASCUS, +HR HPV (not 16/18) per CE. [place] recommended but not done  2/5/24 ASC-H and LSIL, +HR HPV (not 16/18). Plan: colposcopy due before 5/5/24  3/5/24 Colpo bx BETH II, ECC neg. Plan: cone biopsy due before 6/5/24 4/18/24 Cone biopsy scheduled        No past medical history on file.  No past surgical history on file.  Current Outpatient Medications   Medication Sig Dispense Refill    ferrous sulfate (FEROSUL) 325 (65 Fe) MG tablet Take 1 tablet (325 mg) by mouth 2 times daily 120 tablet 1    PREN-FE-METH-FA-OMEG W/O A PO  (Patient not taking: Reported on 3/14/2024)         No Known Allergies     Social History     Tobacco Use    Smoking status: Never    Smokeless tobacco: Never   Substance Use Topics    Alcohol use: Not Currently       History   Drug Use Unknown         Review of Systems    Review of Systems  Constitutional, neuro, ENT, endocrine, pulmonary, cardiac, gastrointestinal, genitourinary, musculoskeletal, integument and psychiatric systems are negative, except as otherwise noted.    Objective    There were no vitals taken for this visit.   There is no height or weight on file to calculate BMI.  Physical Exam  GENERAL: alert  "and no distress  EYES: Eyes grossly normal to inspection, PERRL and conjunctivae and sclerae normal  HENT: ear canals and TM's normal, nose and mouth without ulcers or lesions  NECK: no adenopathy, no asymmetry, masses, or scars  RESP: lungs clear to auscultation - no rales, rhonchi or wheezes  CV: regular rate and rhythm, normal S1 S2, no S3 or S4, no murmur, click or rub, no peripheral edema  ABDOMEN: soft, nontender, no hepatosplenomegaly, no masses and bowel sounds normal  MS: no gross musculoskeletal defects noted, no edema  SKIN: no suspicious lesions or rashes  NEURO: Normal strength and tone, mentation intact and speech normal  PSYCH: mentation appears normal, affect normal/bright    Lab Results   Component Value Date    WBC 7.7 04/08/2024     Lab Results   Component Value Date    RBC 4.50 04/08/2024     Lab Results   Component Value Date    HGB 11.0 04/08/2024     Lab Results   Component Value Date    HCT 36.3 04/08/2024     No components found for: \"MCT\"  Lab Results   Component Value Date    MCV 81 04/08/2024     Lab Results   Component Value Date    MCH 24.4 04/08/2024     Lab Results   Component Value Date    MCHC 30.3 04/08/2024     Lab Results   Component Value Date    RDW 23.5 04/08/2024     Lab Results   Component Value Date     04/08/2024          Diagnostics  Labs pending at this time.  Results will be reviewed when available.   No EKG required for low risk surgery (cataract, skin procedure, breast biopsy, etc).  No EKG required, no history of coronary heart disease, significant arrhythmia, peripheral arterial disease or other structural heart disease.    Revised Cardiac Risk Index (RCRI)  The patient has the following serious cardiovascular risks for perioperative complications:   - No serious cardiac risks = 0 points     RCRI Interpretation: 0 points: Class I (very low risk - 0.4% complication rate)         Signed Electronically by: KELLY Dickinson CNP  Copy of this evaluation " report is provided to requesting physician.

## 2024-04-08 NOTE — PATIENT INSTRUCTIONS
Preparing for Your Surgery  Getting started  A nurse will call you to review your health history and instructions. They will give you an arrival time based on your scheduled surgery time. Please be ready to share:  Your doctor's clinic name and phone number  Your medical, surgical, and anesthesia history  A list of allergies and sensitivities  A list of medicines, including herbal treatments and over-the-counter drugs  Whether the patient has a legal guardian (ask how to send us the papers in advance)  Please tell us if you're pregnant--or if there's any chance you might be pregnant. Some surgeries may injure a fetus (unborn baby), so they require a pregnancy test. Surgeries that are safe for a fetus don't always need a test, and you can choose whether to have one.   If you have a child who's having surgery, please ask for a copy of Preparing for Your Child's Surgery.    Preparing for surgery  Within 10 to 30 days of surgery: Have a pre-op exam (sometimes called an H&P, or History and Physical). This can be done at a clinic or pre-operative center.  If you're having a , you may not need this exam. Talk to your care team.  At your pre-op exam, talk to your care team about all medicines you take. If you need to stop any medicines before surgery, ask when to start taking them again.  We do this for your safety. Many medicines can make you bleed too much during surgery. Some change how well surgery (anesthesia) drugs work.  Call your insurance company to let them know you're having surgery. (If you don't have insurance, call [phone].)  Call your clinic if there's any change in your health. This includes signs of a cold or flu (sore throat, runny nose, cough, rash, fever). It also includes a scrape or scratch near the surgery site.  If you have questions on the day of surgery, call your hospital or surgery center.  Eating and drinking guidelines  For your safety: Unless your surgeon tells you otherwise,  follow the guidelines below.  Eat and drink as usual until 8 hours before you arrive for surgery. After that, no food or milk.  Drink clear liquids until 2 hours before you arrive. These are liquids you can see through, like water, Gatorade, and Propel Water. They also include plain black coffee and tea (no cream or milk), candy, and breath mints. You can spit out gum when you arrive.  If you drink alcohol: Stop drinking it the night before surgery.  If your care team tells you to take medicine on the morning of surgery, it's okay to take it with a sip of water.  Preventing infection  Shower or bathe the night before and morning of your surgery. Follow the instructions your clinic gave you. (If no instructions, use regular soap.)  Don't shave or clip hair near your surgery site. We'll remove the hair if needed.  Don't smoke or vape the morning of surgery. You may chew nicotine gum up to 2 hours before surgery. A nicotine patch is okay.  Note: Some surgeries require you to completely quit smoking and nicotine. Check with your surgeon.  Your care team will make every effort to keep you safe from infection. We will:  Clean our hands often with soap and water (or an alcohol-based hand rub).  Clean the skin at your surgery site with a special soap that kills germs.  Give you a special gown to keep you warm. (Cold raises the risk of infection.)  Wear special hair covers, masks, gowns and gloves during surgery.  Give antibiotic medicine, if prescribed. Not all surgeries need antibiotics.  What to bring on the day of surgery  Photo ID and insurance card  Copy of your health care directive, if you have one  Glasses and hearing aids (bring cases)  You can't wear contacts during surgery  Inhaler and eye drops, if you use them (tell us about these when you arrive)  CPAP machine or breathing device, if you use them  A few personal items, if spending the night  If you have . . .  A pacemaker, ICD (cardiac defibrillator) or other  implant: Bring the ID card.  An implanted stimulator: Bring the remote control.  A legal guardian: Bring a copy of the certified (court-stamped) guardianship papers.  Please remove any jewelry, including body piercings. Leave jewelry and other valuables at home.  If you're going home the day of surgery  You must have a responsible adult drive you home. They should stay with you overnight as well.  If you don't have someone to stay with you, and you aren't safe to go home alone, we may keep you overnight. Insurance often won't pay for this.  After surgery  If it's hard to control your pain or you need more pain medicine, please call your surgeon's office.  Questions?   If you have any questions for your care team, list them here: _________________________________________________________________________________________________________________________________________________________________________ ____________________________________ ____________________________________ ____________________________________  For informational purposes only. Not to replace the advice of your health care provider. Copyright   2003, 2019 [facility]. All rights reserved. Clinically reviewed by Nancy Mccormack MD. [employer] 193620 - REV 12/22.    How to Take Your Medication Before Surgery  - HOLD (do not take) iron  day of

## 2024-04-09 LAB
ALBUMIN SERPL BCG-MCNC: 4.1 G/DL (ref 3.5–5.2)
ALP SERPL-CCNC: 66 U/L (ref 40–150)
ALT SERPL W P-5'-P-CCNC: 14 U/L (ref 0–50)
ANION GAP SERPL CALCULATED.3IONS-SCNC: 9 MMOL/L (ref 7–15)
AST SERPL W P-5'-P-CCNC: 19 U/L (ref 0–45)
BILIRUB SERPL-MCNC: <0.2 MG/DL
BUN SERPL-MCNC: 12.3 MG/DL (ref 6–20)
CALCIUM SERPL-MCNC: 9 MG/DL (ref 8.6–10)
CHLORIDE SERPL-SCNC: 106 MMOL/L (ref 98–107)
CHOLEST SERPL-MCNC: 151 MG/DL
CREAT SERPL-MCNC: 0.92 MG/DL (ref 0.51–0.95)
DEPRECATED HCO3 PLAS-SCNC: 25 MMOL/L (ref 22–29)
EGFRCR SERPLBLD CKD-EPI 2021: 86 ML/MIN/1.73M2
FASTING STATUS PATIENT QL REPORTED: NO
FERRITIN SERPL-MCNC: 33 NG/ML (ref 6–175)
GLUCOSE SERPL-MCNC: 98 MG/DL (ref 70–99)
HDLC SERPL-MCNC: 41 MG/DL
IRON BINDING CAPACITY (ROCHE): 327 UG/DL (ref 240–430)
IRON SATN MFR SERPL: 9 % (ref 15–46)
IRON SERPL-MCNC: 30 UG/DL (ref 37–145)
LDLC SERPL CALC-MCNC: 79 MG/DL
NONHDLC SERPL-MCNC: 110 MG/DL
POTASSIUM SERPL-SCNC: 4.2 MMOL/L (ref 3.4–5.3)
PROT SERPL-MCNC: 7.1 G/DL (ref 6.4–8.3)
SODIUM SERPL-SCNC: 140 MMOL/L (ref 135–145)
TRIGL SERPL-MCNC: 157 MG/DL
TSH SERPL DL<=0.005 MIU/L-ACNC: 1.39 UIU/ML (ref 0.3–4.2)

## 2024-04-12 NOTE — TELEPHONE ENCOUNTER
Per surgery center time moved up to 12pm, my chart message sent to patient and nurse will call prior to surgery also.

## 2024-04-16 ENCOUNTER — ANESTHESIA EVENT (OUTPATIENT)
Dept: SURGERY | Facility: AMBULATORY SURGERY CENTER | Age: 29
End: 2024-04-16
Payer: COMMERCIAL

## 2024-04-18 ENCOUNTER — HOSPITAL ENCOUNTER (OUTPATIENT)
Facility: AMBULATORY SURGERY CENTER | Age: 29
Discharge: HOME | End: 2024-04-18
Attending: OBSTETRICS & GYNECOLOGY
Payer: COMMERCIAL

## 2024-04-18 ENCOUNTER — ANESTHESIA (OUTPATIENT)
Dept: SURGERY | Facility: AMBULATORY SURGERY CENTER | Age: 29
End: 2024-04-18
Payer: COMMERCIAL

## 2024-04-18 VITALS
TEMPERATURE: 97 F | RESPIRATION RATE: 18 BRPM | OXYGEN SATURATION: 100 % | DIASTOLIC BLOOD PRESSURE: 60 MMHG | SYSTOLIC BLOOD PRESSURE: 101 MMHG | WEIGHT: 198.6 LBS | BODY MASS INDEX: 34.09 KG/M2

## 2024-04-18 LAB — HCG UR QL: NEGATIVE

## 2024-04-18 PROCEDURE — 88307 TISSUE EXAM BY PATHOLOGIST: CPT | Performed by: PATHOLOGY

## 2024-04-18 PROCEDURE — 81025 URINE PREGNANCY TEST: CPT | Performed by: OBSTETRICS & GYNECOLOGY

## 2024-04-18 PROCEDURE — 57522 CONIZATION OF CERVIX: CPT | Performed by: OBSTETRICS & GYNECOLOGY

## 2024-04-18 PROCEDURE — G8918 PT W/O PREOP ORDER IV AB PRO: HCPCS

## 2024-04-18 PROCEDURE — 57522 CONIZATION OF CERVIX: CPT | Performed by: ANESTHESIOLOGY

## 2024-04-18 PROCEDURE — 57522 CONIZATION OF CERVIX: CPT

## 2024-04-18 PROCEDURE — 88305 TISSUE EXAM BY PATHOLOGIST: CPT | Performed by: PATHOLOGY

## 2024-04-18 PROCEDURE — G8907 PT DOC NO EVENTS ON DISCHARG: HCPCS

## 2024-04-18 PROCEDURE — 57522 CONIZATION OF CERVIX: CPT | Performed by: REGISTERED NURSE

## 2024-04-18 RX ORDER — OXYCODONE HYDROCHLORIDE 5 MG/1
5 TABLET ORAL
Status: DISCONTINUED | OUTPATIENT
Start: 2024-04-18 | End: 2024-04-19 | Stop reason: HOSPADM

## 2024-04-18 RX ORDER — NALOXONE HYDROCHLORIDE 0.4 MG/ML
0.1 INJECTION, SOLUTION INTRAMUSCULAR; INTRAVENOUS; SUBCUTANEOUS
Status: DISCONTINUED | OUTPATIENT
Start: 2024-04-18 | End: 2024-04-19 | Stop reason: HOSPADM

## 2024-04-18 RX ORDER — BUPIVACAINE HYDROCHLORIDE AND EPINEPHRINE 2.5; 5 MG/ML; UG/ML
INJECTION, SOLUTION INFILTRATION; PERINEURAL PRN
Status: DISCONTINUED | OUTPATIENT
Start: 2024-04-18 | End: 2024-04-18 | Stop reason: HOSPADM

## 2024-04-18 RX ORDER — FERRIC SUBSULFATE 0.21 G/G
LIQUID TOPICAL PRN
Status: DISCONTINUED | OUTPATIENT
Start: 2024-04-18 | End: 2024-04-18 | Stop reason: HOSPADM

## 2024-04-18 RX ORDER — ACETAMINOPHEN 325 MG/1
975 TABLET ORAL ONCE
Status: COMPLETED | OUTPATIENT
Start: 2024-04-18 | End: 2024-04-18

## 2024-04-18 RX ORDER — IBUPROFEN 400 MG/1
800 TABLET, FILM COATED ORAL ONCE
Status: DISCONTINUED | OUTPATIENT
Start: 2024-04-18 | End: 2024-04-19 | Stop reason: HOSPADM

## 2024-04-18 RX ORDER — KETOROLAC TROMETHAMINE 30 MG/ML
INJECTION, SOLUTION INTRAMUSCULAR; INTRAVENOUS PRN
Status: DISCONTINUED | OUTPATIENT
Start: 2024-04-18 | End: 2024-04-18

## 2024-04-18 RX ORDER — ONDANSETRON 2 MG/ML
INJECTION INTRAMUSCULAR; INTRAVENOUS PRN
Status: DISCONTINUED | OUTPATIENT
Start: 2024-04-18 | End: 2024-04-18

## 2024-04-18 RX ORDER — PROPOFOL 10 MG/ML
INJECTION, EMULSION INTRAVENOUS PRN
Status: DISCONTINUED | OUTPATIENT
Start: 2024-04-18 | End: 2024-04-18

## 2024-04-18 RX ORDER — SODIUM CHLORIDE, SODIUM LACTATE, POTASSIUM CHLORIDE, CALCIUM CHLORIDE 600; 310; 30; 20 MG/100ML; MG/100ML; MG/100ML; MG/100ML
INJECTION, SOLUTION INTRAVENOUS CONTINUOUS
Status: DISCONTINUED | OUTPATIENT
Start: 2024-04-18 | End: 2024-04-19 | Stop reason: HOSPADM

## 2024-04-18 RX ORDER — ONDANSETRON 2 MG/ML
4 INJECTION INTRAMUSCULAR; INTRAVENOUS EVERY 30 MIN PRN
Status: DISCONTINUED | OUTPATIENT
Start: 2024-04-18 | End: 2024-04-19 | Stop reason: HOSPADM

## 2024-04-18 RX ORDER — LIDOCAINE HYDROCHLORIDE 20 MG/ML
INJECTION, SOLUTION INFILTRATION; PERINEURAL PRN
Status: DISCONTINUED | OUTPATIENT
Start: 2024-04-18 | End: 2024-04-18

## 2024-04-18 RX ORDER — ACETAMINOPHEN 325 MG/1
975 TABLET ORAL ONCE
Status: DISCONTINUED | OUTPATIENT
Start: 2024-04-18 | End: 2024-04-19 | Stop reason: HOSPADM

## 2024-04-18 RX ORDER — HYDROXYZINE HYDROCHLORIDE 25 MG/1
25 TABLET, FILM COATED ORAL
Status: DISCONTINUED | OUTPATIENT
Start: 2024-04-18 | End: 2024-04-19 | Stop reason: HOSPADM

## 2024-04-18 RX ORDER — FENTANYL CITRATE 50 UG/ML
INJECTION, SOLUTION INTRAMUSCULAR; INTRAVENOUS PRN
Status: DISCONTINUED | OUTPATIENT
Start: 2024-04-18 | End: 2024-04-18

## 2024-04-18 RX ORDER — OXYCODONE HYDROCHLORIDE 5 MG/1
10 TABLET ORAL
Status: DISCONTINUED | OUTPATIENT
Start: 2024-04-18 | End: 2024-04-19 | Stop reason: HOSPADM

## 2024-04-18 RX ORDER — LIDOCAINE 40 MG/G
CREAM TOPICAL
Status: DISCONTINUED | OUTPATIENT
Start: 2024-04-18 | End: 2024-04-19 | Stop reason: HOSPADM

## 2024-04-18 RX ORDER — ONDANSETRON 4 MG/1
4 TABLET, ORALLY DISINTEGRATING ORAL EVERY 30 MIN PRN
Status: DISCONTINUED | OUTPATIENT
Start: 2024-04-18 | End: 2024-04-19 | Stop reason: HOSPADM

## 2024-04-18 RX ORDER — PROPOFOL 10 MG/ML
INJECTION, EMULSION INTRAVENOUS CONTINUOUS PRN
Status: DISCONTINUED | OUTPATIENT
Start: 2024-04-18 | End: 2024-04-18

## 2024-04-18 RX ORDER — IODINE AND POTASSIUM IODIDE 50; 100 MG/ML; MG/ML
LIQUID ORAL PRN
Status: DISCONTINUED | OUTPATIENT
Start: 2024-04-18 | End: 2024-04-18 | Stop reason: HOSPADM

## 2024-04-18 RX ADMIN — FENTANYL CITRATE 25 MCG: 50 INJECTION, SOLUTION INTRAMUSCULAR; INTRAVENOUS at 12:37

## 2024-04-18 RX ADMIN — ONDANSETRON 4 MG: 2 INJECTION INTRAMUSCULAR; INTRAVENOUS at 12:52

## 2024-04-18 RX ADMIN — ACETAMINOPHEN 975 MG: 325 TABLET ORAL at 11:49

## 2024-04-18 RX ADMIN — PROPOFOL 100 MCG/KG/MIN: 10 INJECTION, EMULSION INTRAVENOUS at 12:38

## 2024-04-18 RX ADMIN — KETOROLAC TROMETHAMINE 30 MG: 30 INJECTION, SOLUTION INTRAMUSCULAR; INTRAVENOUS at 12:55

## 2024-04-18 RX ADMIN — FENTANYL CITRATE 50 MCG: 50 INJECTION, SOLUTION INTRAMUSCULAR; INTRAVENOUS at 12:32

## 2024-04-18 RX ADMIN — FENTANYL CITRATE 25 MCG: 50 INJECTION, SOLUTION INTRAMUSCULAR; INTRAVENOUS at 12:38

## 2024-04-18 RX ADMIN — SODIUM CHLORIDE, SODIUM LACTATE, POTASSIUM CHLORIDE, CALCIUM CHLORIDE: 600; 310; 30; 20 INJECTION, SOLUTION INTRAVENOUS at 12:12

## 2024-04-18 RX ADMIN — PROPOFOL 50 MG: 10 INJECTION, EMULSION INTRAVENOUS at 12:38

## 2024-04-18 RX ADMIN — LIDOCAINE HYDROCHLORIDE 40 MG: 20 INJECTION, SOLUTION INFILTRATION; PERINEURAL at 12:38

## 2024-04-18 NOTE — ANESTHESIA PREPROCEDURE EVALUATION
"Anesthesia Pre-Procedure Evaluation    Patient: Lance Disla   MRN: [id number] : 1995        Procedure : Procedure(s):  CONE BIOPSY, CERVIX, USING LOOP ELECTROSURGICAL EXCISION PROCEDURE (LEEP)          History reviewed. No pertinent past medical history.   History reviewed. No pertinent surgical history.   No Known Allergies   Social History     Tobacco Use    Smoking status: Never    Smokeless tobacco: Never   Substance Use Topics    Alcohol use: Not Currently      Wt Readings from Last 1 Encounters:   24 90.1 kg (198 lb 9.6 oz)        Anesthesia Evaluation            ROS/MED HX  ENT/Pulmonary:       Neurologic:       Cardiovascular:       METS/Exercise Tolerance:     Hematologic:       Musculoskeletal:       GI/Hepatic:       Renal/Genitourinary:       Endo:     (+)               Obesity,       Psychiatric/Substance Use:       Infectious Disease:       Malignancy:       Other:            Physical Exam    Airway        Mallampati: II   TM distance: > 3 FB   Neck ROM: full     Respiratory Devices and Support         Dental       (+) Completely normal teeth      Cardiovascular          Rhythm and rate: regular     Pulmonary           breath sounds clear to auscultation           OUTSIDE LABS:  CBC:   Lab Results   Component Value Date    WBC 7.7 2024    WBC 7.5 2024    HGB 11.0 (L) 2024    HGB 9.2 (L) 2024    HCT 36.3 2024    HCT 31.5 (L) 2024     2024     2024     BMP:   Lab Results   Component Value Date     2024     2024    POTASSIUM 4.2 2024    POTASSIUM 3.8 2024    CHLORIDE 106 2024    CHLORIDE 105 2024    CO2 25 2024    CO2 24 2024    BUN 12.3 2024    BUN 10.2 2024    CR 0.92 2024    CR 0.70 2024    GLC 98 2024    GLC 84 2024     COAGS: No results found for: \"PTT\", \"INR\", \"FIBR\"  POC:   Lab Results   Component Value Date    HCG Negative " "04/18/2024     HEPATIC:   Lab Results   Component Value Date    ALBUMIN 4.1 04/08/2024    PROTTOTAL 7.1 04/08/2024    ALT 14 04/08/2024    AST 19 04/08/2024    ALKPHOS 66 04/08/2024    BILITOTAL <0.2 04/08/2024     OTHER:   Lab Results   Component Value Date    A1C 4.8 04/08/2024    JESSE 9.0 04/08/2024    TSH 1.39 04/08/2024       Anesthesia Plan    ASA Status:  2       Anesthesia Type: MAC.     - Reason for MAC: immobility needed              Consents    Anesthesia Plan(s) and associated risks, benefits, and realistic alternatives discussed. Questions answered and patient/representative(s) expressed understanding.     - Discussed:     - Discussed with:  Patient            Postoperative Care    Pain management: Multi-modal analgesia.   PONV prophylaxis: Ondansetron (or other 5HT-3), Background Propofol Infusion     Comments:               Mookie Salazar MD    I have reviewed the pertinent notes and labs in the chart from the past 30 days and (re)examined the patient.  Any updates or changes from those notes are reflected in this note.              # Obesity: Estimated body mass index is 34.09 kg/m  as calculated from the following:    Height as of 4/8/24: 1.626 m (5' 4\").    Weight as of 4/8/24: 90.1 kg (198 lb 9.6 oz).      "

## 2024-04-18 NOTE — DISCHARGE INSTRUCTIONS
Nothing in the vagina for 4 weeks.  No tub baths for 2 weeks  May resume normal activity in 1 day  Resume normal diet as tolerated  Do not drive for 24 hours after surgery  Expect cramping and light spotting for 1-2 weeks. May take Motrin and/or Tylenol as needed.  Return to the office for post-operative visit in 4 weeks.      Today you received 975 mg of Tylenol/acetaminophen at 11:50 AM. You may repeat this dose after 5:50 PM. Do not take more than 4,000 mg of Tylenol/acetaminophen in a 24 hour period.     Today you received Toradol, an antiinflammatory medication similar to Ibuprofen.  You should not take other antiinflammatory medication, such as Ibuprofen, Motrin, Advil, Aleve, Naprosyn, etc until 7 PM.

## 2024-04-18 NOTE — ANESTHESIA CARE TRANSFER NOTE
Patient: Lance Disla    Procedure: Procedure(s):  CONE BIOPSY, CERVIX, USING LOOP ELECTROSURGICAL EXCISION PROCEDURE (LEEP)       Diagnosis: BETH II (cervical intraepithelial neoplasia II) [N87.1]  Diagnosis Additional Information: No value filed.    Anesthesia Type:   MAC     Note:    Oropharynx: oropharynx clear of all foreign objects and spontaneously breathing  Level of Consciousness: drowsy  Oxygen Supplementation: face mask  Level of Supplemental Oxygen (L/min / FiO2): 8  Independent Airway: airway patency satisfactory and stable  Dentition: dentition unchanged  Vital Signs Stable: post-procedure vital signs reviewed and stable  Report to RN Given: handoff report given  Patient transferred to: Phase II    Handoff Report: Identifed the Patient, Identified the Reponsible Provider, Reviewed the pertinent medical history, Discussed the surgical course, Reviewed Intra-OP anesthesia mangement and issues during anesthesia, Set expectations for post-procedure period and Allowed opportunity for questions and acknowledgement of understanding    Vitals:  Vitals Value Taken Time   BP     Temp     Pulse     Resp     SpO2         Electronically Signed By: KELLY Perry CRNA  April 18, 2024  1:04 PM

## 2024-04-18 NOTE — OP NOTE
HOSPITAL OPERATIVE NOTE  DATE/TIME OF SURGERY: 2024  PATIENT NAME: Lance Disla  MRN: [id number]   PATIENT : 1995       Preoperative Diagnosis: BETH II    Postoperative Diagnosis: same    Surgeon: Judy Reynolds DO    Procedure:  LEEP, ECC    Anesthesia: MAC, local    EBL:   5 ml      Speciman:  Cone biopsy in three pieces, endocervical curettings    Complications:  None    Indication: Lance Disla is a 28 year old  who presents for LEEP for BETH II. Details of the procedure were discussed with the patient.  Risks include, but are not limited to, bleeding, infection, and injury to surrounding organs such as the bowel, urinary system, nerves, and blood vessels.  Injury may result in repair at the time of the surgery or in a separate procedure.  All questions answered, and accepting these risks, the patient elects to proceed with the procedure.     Procedure: Lance Disla was taken back to the operating room with IV fluids running where she was placed under MAC anesthesia.  She was prepped and draped in normal manner for surgery.    An insulated bivalve speculum was inserted into the vagina to expose the cervix.  At 3 o'clock, 9 o'clock, and 12 o'clock, 0.25% marcained with epi was injected, in addition to standard paracervical block.  The cervix was coated with Lugol's solution, with findings as noted above.  An insulated lateral wall retractor was then placed in the vagina to provide better exposure and protect the vaginal walls.    A 20 x 12 mm loop electrode was then obtained, and a cone biopsy was carried out using in the usual fashion. Due to large size of cervix this was taken in three pieces.  The specimen was removed, and sent to pathology.  Minimal bleeding was encountered.  ECC as then collected. After excision of the cone biopsy specimen, an additional area of surrounding epithelium was cauterized with the ball electrode.  Further hemostasis was obtained with Monsel solution.  Good  hemostasis was noted at this time.    All instruments were removed and the patient was returned to the supine position.  She was awakened from anesthesia without difficulty and transferred to the recovery room in good condition.  She tolerated the procedure well with no complications.  All instrument sponge counts were correct x2.    Judy Reynolds DO

## 2024-04-18 NOTE — ANESTHESIA POSTPROCEDURE EVALUATION
Patient: Lance Disla    Procedure: Procedure(s):  CONE BIOPSY, CERVIX, USING LOOP ELECTROSURGICAL EXCISION PROCEDURE (LEEP)       Anesthesia Type:  MAC    Note:  Disposition: Outpatient   Postop Pain Control: Uneventful            Sign Out: Well controlled pain   PONV: No   Neuro/Psych: Uneventful            Sign Out: Acceptable/Baseline neuro status   Airway/Respiratory: Uneventful            Sign Out: Acceptable/Baseline resp. status   CV/Hemodynamics: Uneventful            Sign Out: Acceptable CV status; No obvious hypovolemia; No obvious fluid overload   Other NRE: NONE   DID A NON-ROUTINE EVENT OCCUR?            Last vitals:  Vitals Value Taken Time   BP     Temp     Pulse     Resp     SpO2         Electronically Signed By: Mookie Salazar MD  April 18, 2024  1:14 PM

## 2024-04-23 LAB
PATH REPORT.COMMENTS IMP SPEC: NORMAL
PATH REPORT.FINAL DX SPEC: NORMAL
PATH REPORT.GROSS SPEC: NORMAL
PATH REPORT.RELEVANT HX SPEC: NORMAL
PHOTO IMAGE: NORMAL

## 2024-05-03 ENCOUNTER — PATIENT OUTREACH (OUTPATIENT)
Dept: OBGYN | Facility: CLINIC | Age: 29
End: 2024-05-03
Payer: COMMERCIAL

## 2024-05-03 PROBLEM — D06.9 HIGH GRADE SQUAMOUS INTRAEPITHELIAL LESION (HGSIL), GRADE 3 CIN, ON BIOPSY OF CERVIX: Status: ACTIVE | Noted: 2024-02-05

## 2024-05-14 NOTE — PATIENT INSTRUCTIONS
If you have any questions regarding your visit, Please contact your care team.    [facility]: [phone]      [facility] CLINIC HOURS TELEPHONE NUMBER   Judy Reynolds DO.    LYNNE Muñoz-Surgery Scheduler  Marti - Surgery Scheduler    BREE Ramesh RN Kylie, RN     Monday, Thursday  [place]  7am-3pm    Tuesday, Wednesday  [place]  7am-3pm    Friday  [place]  1pm-3:30pm    Typical Surgery Days: Thursday or Friday   [facility]  [address] [place] [address] [phone] Phone  [phone] Fax    [facility]  [address] [place] [address] [phone] Phone    Imaging [phone] Phone    [facility] Delivery:  [phone] Phone     **Surgeries** Our Surgery Schedulers will contact you to schedule. If you do not receive a call within 3 business days, please call [phone].    Urgent Care locations:  [facility] Saturday and    9 am - 5 pm    Monday-Friday   12 pm - 8 pm  Saturday and    9 am - 5 pm   [phone]       If you need a medication refill, please contact your pharmacy. Please allow 3 business days for your refill to be completed.  As always, Thank you for trusting us with your healthcare needs!

## 2024-05-15 ENCOUNTER — OFFICE VISIT (OUTPATIENT)
Dept: OBGYN | Facility: CLINIC | Age: 29
End: 2024-05-15
Payer: COMMERCIAL

## 2024-05-15 VITALS
BODY MASS INDEX: 33.06 KG/M2 | WEIGHT: 192.6 LBS | SYSTOLIC BLOOD PRESSURE: 116 MMHG | DIASTOLIC BLOOD PRESSURE: 77 MMHG | HEART RATE: 61 BPM

## 2024-05-15 DIAGNOSIS — Z98.890 POSTOPERATIVE STATE: Primary | ICD-10-CM

## 2024-05-15 DIAGNOSIS — D06.9 CIN III (CERVICAL INTRAEPITHELIAL NEOPLASIA III): ICD-10-CM

## 2024-05-15 PROCEDURE — 99024 POSTOP FOLLOW-UP VISIT: CPT | Performed by: OBSTETRICS & GYNECOLOGY

## 2024-05-15 NOTE — PROGRESS NOTES
SUBJECTIVE:       HPI: Lance Disla is a 28 year old  is s/p LEEP on  for BETH III. Since surgery, she has been doing well. Minimal bleeding, minimal discomfort.      Ob Hx:  s/p   OB History    Para Term  AB Living   3 2 2 0 1 2   SAB IAB Ectopic Multiple Live Births   1 0 0 0 2      # Outcome Date GA Lbr Peng/2nd Weight Sex Type Anes PTL Lv   3 Term 16 39w5d   F Vag-Spont   BALWINDER      Name: Lisa      Apgar1: 8  Apgar5: 9   2 SAB 10/2014        ND   1 Term 13 41w0d 12:00 3.612 kg (7 lb 15.4 oz) M    BALWINDER      Birth Comments: ? coccyx fx      Name: amanda      Apgar1: 9  Apgar5: 9         Today's PHQ-2 Score:       2024     7:47 AM   PHQ-2 (  [employer])   Q1: Little interest or pleasure in doing things 2   Q2: Feeling down, depressed or hopeless 2   PHQ-2 Score 4   Q1: Little interest or pleasure in doing things More than half the days   Q2: Feeling down, depressed or hopeless More than half the days   PHQ-2 Score 4     Today's PHQ-9 Score:       2024     7:47 AM   PHQ-9 SCORE   PHQ-9 Total Score MyChart 15 (Moderately severe depression)   PHQ-9 Total Score 15     Today's AARON-7 Score:        No data to display                Problem list and histories reviewed & adjusted, as indicated.  Additional history: as documented.    Patient Active Problem List   Diagnosis    BETH III (cervical intraepithelial neoplasia III)     Past Surgical History:   Procedure Laterality Date    CONIZATION LEEP N/A 2024    Procedure: CONE BIOPSY, CERVIX, USING LOOP ELECTROSURGICAL EXCISION PROCEDURE (LEEP);  Surgeon: Judy Reynolds DO;  Location:  OR      Social History     Tobacco Use    Smoking status: Never    Smokeless tobacco: Never   Substance Use Topics    Alcohol use: Not Currently      No data available              Current Outpatient Medications   Medication Sig Dispense Refill    ferrous sulfate (FEROSUL) 325 (65 Fe) MG tablet Take 1 tablet (325 mg) by mouth 2 times  daily 120 tablet 1    PREN-FE-METH-FA-OMEG W/O A PO        No current facility-administered medications for this visit.     No Known Allergies    ROS:  10 Point review of systems negative other noted above in HPI    OBJECTIVE:     /77   Pulse 61   Wt 87.4 kg (192 lb 9.6 oz)   LMP 2024 (Approximate)   BMI 33.06 kg/m    Body mass index is 33.06 kg/m .        Gen: Alert, oriented, appropriately interactive, NAD  Resp: no audible wheeze, cough, or visible cyanosis.  No visible retractions or increased work of breathing.  Able to speak fully in complete sentences.  Abdomen: soft, non tender, non distended  External genitalia: no lesions; normal appearing external genitalia, bartholins glands, urethra, skenes glands  Vagina: no masses or lesions or discharge, normally rugated.  Cervix: no masses or lesions or discharge. LEEP bed healing well.  Psych: mentation appears normal, affect normal/bright, judgement and insight intact, normal speech and appearance well-groomed      In-Clinic Test Results:  No results found for this or any previous visit (from the past 24 hour(s)).    Path  Final Diagnosis   A. Endocervical curettings:  -Small fragments of endocervical tissue, negative for dysplasia  -Endometrial mucosa, unremarkable     B. CERVIX CONIZATION - 3 PIECES:  -High-grade squamous intraepithelial lesion (BETH 2-3) in largest piece (see comment)   Electronically signed by Aaliyah Galan MD on 2024 at  1:51 PM       ASSESSMENT/PLAN:                                                      Lance Disla is a 28 year old  s/p  LEEP on  for BETH III      ICD-10-CM    1. Postoperative state  Z98.890       2. BETH III (cervical intraepithelial neoplasia III)  D06.9           Doing well postoperatively. Pathology results reviewed. Recommend colposcopy with ECC in 6 months per guidelines given uncertainty of margins.    Cleared for routine to normal activity, including intercourse.     Contraception -  condoms. Pregnancy planning reviewed. Discussed starting PNV and conception timing. All questions answered.      Judy Reynolds DO  [facility]

## 2024-06-10 ENCOUNTER — PATIENT OUTREACH (OUTPATIENT)
Dept: FAMILY MEDICINE | Facility: CLINIC | Age: 29
End: 2024-06-10
Payer: COMMERCIAL

## 2024-06-10 NOTE — TELEPHONE ENCOUNTER
Patient Quality Outreach    Patient is due for the following:   Physical Preventive Adult Physical      Topic Date Due    Hepatitis B Vaccine (3 of 3 - 3-dose series) 11/27/2014       Next Steps:   Schedule a Adult Preventative    Type of outreach:    Sent [employer] message.      Questions for provider review:    None           ROLY MCDOWELL MA

## 2024-09-13 ENCOUNTER — PATIENT OUTREACH (OUTPATIENT)
Dept: OBGYN | Facility: CLINIC | Age: 29
End: 2024-09-13
Payer: COMMERCIAL

## 2024-09-13 DIAGNOSIS — D06.9 HIGH GRADE SQUAMOUS INTRAEPITHELIAL LESION (HGSIL), GRADE 3 CIN, ON BIOPSY OF CERVIX: Primary | ICD-10-CM

## 2024-10-05 ENCOUNTER — HEALTH MAINTENANCE LETTER (OUTPATIENT)
Age: 29
End: 2024-10-05

## 2024-12-18 ENCOUNTER — PATIENT OUTREACH (OUTPATIENT)
Dept: FAMILY MEDICINE | Facility: CLINIC | Age: 29
End: 2024-12-18
Payer: COMMERCIAL

## 2024-12-18 NOTE — TELEPHONE ENCOUNTER
Patient Quality Outreach    Patient is due for the following:   Cervical Cancer Screening - PAP Needed  Physical Preventive Adult Physical      Topic Date Due    Hepatitis B Vaccine (3 of 3 - 3-dose series) 11/27/2014    Flu Vaccine (1) 09/01/2024    COVID-19 Vaccine (5 - 2024-25 season) 09/01/2024       Action(s) Taken:   Schedule a Adult Preventative    Type of outreach:    Sent [employer] message.    Questions for provider review:    None           ROLY MCDOWELL MA

## 2025-01-03 PROBLEM — D06.9 HIGH GRADE SQUAMOUS INTRAEPITHELIAL LESION (HGSIL), GRADE 3 CIN, ON BIOPSY OF CERVIX: Status: ACTIVE | Noted: 2024-02-05

## 2025-01-27 ENCOUNTER — OFFICE VISIT (OUTPATIENT)
Dept: URGENT CARE | Facility: URGENT CARE | Age: 30
End: 2025-01-27
Payer: OTHER MISCELLANEOUS

## 2025-01-27 ENCOUNTER — ANCILLARY PROCEDURE (OUTPATIENT)
Dept: GENERAL RADIOLOGY | Facility: CLINIC | Age: 30
End: 2025-01-27
Payer: OTHER MISCELLANEOUS

## 2025-01-27 VITALS
DIASTOLIC BLOOD PRESSURE: 80 MMHG | TEMPERATURE: 98.4 F | HEART RATE: 71 BPM | OXYGEN SATURATION: 99 % | RESPIRATION RATE: 18 BRPM | SYSTOLIC BLOOD PRESSURE: 124 MMHG | WEIGHT: 166.6 LBS | BODY MASS INDEX: 28.6 KG/M2

## 2025-01-27 DIAGNOSIS — M25.561 ACUTE PAIN OF RIGHT KNEE: ICD-10-CM

## 2025-01-27 DIAGNOSIS — M25.561 ACUTE PAIN OF RIGHT KNEE: Primary | ICD-10-CM

## 2025-01-27 PROCEDURE — 73562 X-RAY EXAM OF KNEE 3: CPT | Mod: TC | Performed by: RADIOLOGY

## 2025-01-27 PROCEDURE — 99213 OFFICE O/P EST LOW 20 MIN: CPT

## 2025-01-27 ASSESSMENT — PAIN SCALES - GENERAL: PAINLEVEL_OUTOF10: SEVERE PAIN (8)

## 2025-01-27 NOTE — LETTER
January 27, 2025      Lance Disla  [address]  [place] [address]        To Whom It May Concern:    Lance Disla  was seen on 1/27/25.  Please excuse her  until 1/28/25 due to injury.        Sincerely,        KELLY Alex CNP    Electronically signed

## 2025-01-27 NOTE — PATIENT INSTRUCTIONS
Your xray was negative for fracture or dislocation. I suspect you may have twisted a ligament in your knee. Recommended treatment for this is RICE therapy:  R: Rest. Rest the injured area as you are able  I: Ice. Ice trace injured area for 10-20 minutes at least 3-4 times per day  C: Compress. Compress the area to help reduce swelling  E: Elevate. Elevate the injured limb above the heart when able to reduce swelling      I did place referral to orthopedics, you may schedule an appointment with them if symptoms persist.

## 2025-01-27 NOTE — PROGRESS NOTES
Patient presents with:  Knee Pain: Right knee injured at work today.       Clinical Decision Making:      ICD-10-CM    1. Acute pain of right knee  M25.561 XR Knee Right 3 Views     Orthopedic  Referral        Patient with pain to medial aspect of her right knee after twisting her knee inward today while trying to catch herself on her chair.  X-ray negative for fracture or dislocation.  Luis test negative.  Anterior and posterior drawer test negative.  I suspect her injury may be due to a strain to her MCL or meniscal injury.  Recommend RICE therapy and Tylenol/ibuprofen as needed for pain management.  I also sent her home with an Ace bandage that she may use for compression and stability of her knee.  We discussed that this should heal over the next 1 to 2 weeks but if pain does not improved I placed an orthopedic referral for her to follow-up for further evaluation and management if needed. Patient instructions as below. Discussed red flag symptoms for when to return.       Patient Instructions   Your xray was negative for fracture or dislocation. I suspect you may have twisted a ligament in your knee. Recommended treatment for this is RICE therapy:  R: Rest. Rest the injured area as you are able  I: Ice. Ice trace injured area for 10-20 minutes at least 3-4 times per day  C: Compress. Compress the area to help reduce swelling  E: Elevate. Elevate the injured limb above the heart when able to reduce swelling      I did place referral to orthopedics, you may schedule an appointment with them if symptoms persist.     HPI:  Lance Disla is a 29 year old female who presents today with concerns of right knee injury. This occurred today at work as she was in her chair and it starting sliding, as she tried to catch herself,  her knee twisted inward. She has been having pain ever since. She still able to bear weight but it is painful to do so.      History obtained from the patient.    Problem List:  2024-03:  BETH III (cervical intraepithelial neoplasia III)  2024-02: High grade squamous intraepithelial lesion (HGSIL), grade 3   BETH, on biopsy of cervix      No past medical history on file.    Social History     Tobacco Use    Smoking status: Never    Smokeless tobacco: Never   Substance Use Topics    Alcohol use: Not Currently       ROS is negative other than what is noted in HPI.       Vitals:    01/27/25 1438   BP: 124/80   BP Location: Left arm   Cuff Size: Adult Regular   Pulse: 71   Resp: 18   Temp: 98.4  F (36.9  C)   TempSrc: Tympanic   SpO2: 99%   Weight: 75.6 kg (166 lb 9.6 oz)       Physical Exam  Constitutional:       General: She is not in acute distress.     Appearance: She is not diaphoretic.   HENT:      Head: Normocephalic and atraumatic.      Right Ear: External ear normal.      Left Ear: External ear normal.   Eyes:      Conjunctiva/sclera: Conjunctivae normal.   Cardiovascular:      Rate and Rhythm: Normal rate and regular rhythm.      Pulses: Normal pulses.   Pulmonary:      Effort: Pulmonary effort is normal. No respiratory distress.   Musculoskeletal:         General: Swelling and tenderness present. Normal range of motion.      Comments: Mild swelling and tenderness to medial aspect right of knee. No erythema. Luis negative. Anterior and posterior drawer negative.    Skin:     General: Skin is warm.      Findings: No erythema.   Neurological:      Mental Status: She is alert.   Psychiatric:         Mood and Affect: Mood normal.         Thought Content: Thought content normal.         Judgment: Judgment normal.         Results:  Results for orders placed or performed in visit on 01/27/25   XR Knee Right 3 Views     Status: None    Narrative    EXAM: XR KNEE RIGHT 3 VIEWS  [facility]  DATE: 1/27/2025    INDICATION:  Acute pain of right knee  COMPARISON: None.      Impression    IMPRESSION: Normal joint spaces and alignment. No fracture or joint effusion.       I  have personally ordered and preliminarily reviewed the following xray, I have noted no evidence of fracture or dislocation.         At the end of the encounter, I discussed results, diagnosis, medications. Discussed red flags for immediate return to clinic/ER, as well as indications for follow up if no improvement. Patient understood and agreed to plan. Patient was stable for discharge.    KELLY Alex [facility]

## 2025-01-28 NOTE — PROGRESS NOTES
ASSESSMENT & PLAN    Lance was seen today for pain and injury.    Diagnoses and all orders for this visit:    Injury of right knee, initial encounter  -     Orthopedic  Referral    Favor MCL though describes varus twisting mechanism.  See below.  Questions answered. Discussed signs and symptoms that may indicate more serious issues; the patient was instructed to seek appropriate care if noted. Lance indicates understanding of these issues and agrees with the plan.      See Patient Instructions  Patient Instructions   With right knee injury, exam findings fit best with MCL sprain, though no laxity on exam, and injury pattern with more of a varus twisting stress is injury in light of that. Medial meniscus injury is also a consideration given mechanism and pop at time of injury, but tenderness is more at proximal MCL, and mild pain provoked with exam. Otherwise, no clear internal derangement on exam noted today.  We discussed continued monitoring, support, and giving this injury more time; vs MRI.  Following discussion, plan monitoring.  May do icing, over the counter medication if needed.  May use compression for comfort.  Limit time on feet for comfort. Letter provided, focus on seated work on return to work.  Defer MRI currently. Can reconsider depending on course.  Follow-up 7-10 days for recheck, sooner if needed.    If you have any further questions for your physician or physician s care team you can contact them thru MyChart or by calling [phone].      [facility]    -----  Chief Complaint   Patient presents with    Right Knee - Pain, Injury       SUBJECTIVE  Lance Disla is a/an 29 year old female who is seen as an Urgent Care referral for evaluation of right knee.      injury    Onset: 1/27/24. Patient describes injury as sitting in her chair when it starting sliding, as she tried to catch herself,  her knee twisted, varus   Location of  "Pain: right knee, medial jointline  Worsened by: Weightbearing,extension  Better with:   Treatments tried: Ace bandage from  and tiger balm  Associated symptoms: swelling, Cracking    Orthopedic/Surgical history: NO  Social History/Occupation: [employer]    **  Above information per rooming staff.  Additional history:  Sat down in chair, to stop chair from sliding used right LE; noted a crack sensation in knee. Describes more of a varus and twisting stress. Pain has been medial knee.  + swelling, no bruising.  Some limp.        REVIEW OF SYSTEMS:  Review of Systems    OBJECTIVE:  Ht 1.626 m (5' 4\")   Wt 75.3 kg (166 lb)   BMI 28.49 kg/m           Right Knee exam    Inspection:   mild effusion, appearance of fullness/swelling   no ecchymosis    ROM:      Flexion >100-110 degrees       Extension actively 5-10 degrees, passively full       Range of motion limited by pain    Patellar Motion:      Normal patellar tracking noted through range of motion  No pain with patellar translation    Tender:      medial joint line       Proximal MCL    Special Tests:      mild pain with Luis       neg (-) Lachman       neg (-) anterior drawer       neg (-) posterior drawer       neg (-) varus       neg (-) valgus for laxity, min pain       + medial pain with forced extension        RADIOLOGY:  Final results and radiologist's interpretation, available in the [facility] health record.  Images were reviewed with the patient in the office today.  My personal interpretation of the performed imaging: no fracture noted. Joint spaces preserved.        EXAM: XR KNEE RIGHT 3 VIEWS  LOCATION: [facility]  DATE: 1/27/2025     INDICATION:  Acute pain of right knee  COMPARISON: None.                                                                      IMPRESSION: Normal joint spaces and alignment. No fracture or joint effusion.            Review of prior external note(s) from -   Review of the result(s) of each " unique test - imaging  Independent interpretation of a test performed by another physician/other qualified health care professional (not separately reported) - imaging

## 2025-01-29 ENCOUNTER — OFFICE VISIT (OUTPATIENT)
Dept: ORTHOPEDICS | Facility: CLINIC | Age: 30
End: 2025-01-29
Payer: OTHER MISCELLANEOUS

## 2025-01-29 VITALS — BODY MASS INDEX: 28.34 KG/M2 | WEIGHT: 166 LBS | HEIGHT: 64 IN

## 2025-01-29 DIAGNOSIS — S89.91XA INJURY OF RIGHT KNEE, INITIAL ENCOUNTER: ICD-10-CM

## 2025-01-29 PROCEDURE — 99203 OFFICE O/P NEW LOW 30 MIN: CPT | Performed by: PEDIATRICS

## 2025-01-29 NOTE — PATIENT INSTRUCTIONS
With right knee injury, exam findings fit best with MCL sprain, though no laxity on exam, and injury pattern with more of a varus twisting stress is injury in light of that. Medial meniscus injury is also a consideration given mechanism and pop at time of injury, but tenderness is more at proximal MCL, and mild pain provoked with exam. Otherwise, no clear internal derangement on exam noted today.  We discussed continued monitoring, support, and giving this injury more time; vs MRI.  Following discussion, plan monitoring.  May do icing, over the counter medication if needed.  May use compression for comfort.  Limit time on feet for comfort. Letter provided, focus on seated work on return to work.  Defer MRI currently. Can reconsider depending on course.  Follow-up 7-10 days for recheck, sooner if needed.    If you have any further questions for your physician or physician s care team you can contact them thru [employer] or by calling [phone].

## 2025-01-29 NOTE — LETTER
1/29/2025      Lance Disla  [address]  [facility] [address]      Dear Colleague,    Thank you for referring your patient, Lance Disla, to the [facility]. Please see a copy of my visit note below.    ASSESSMENT & PLAN    Lance was seen today for pain and injury.    Diagnoses and all orders for this visit:    Injury of right knee, initial encounter  -     Orthopedic  Referral    Favor MCL though describes varus twisting mechanism.  See below.  Questions answered. Discussed signs and symptoms that may indicate more serious issues; the patient was instructed to seek appropriate care if noted. Lance indicates understanding of these issues and agrees with the plan.      See Patient Instructions  Patient Instructions   With right knee injury, exam findings fit best with MCL sprain, though no laxity on exam, and injury pattern with more of a varus twisting stress is injury in light of that. Medial meniscus injury is also a consideration given mechanism and pop at time of injury, but tenderness is more at proximal MCL, and mild pain provoked with exam. Otherwise, no clear internal derangement on exam noted today.  We discussed continued monitoring, support, and giving this injury more time; vs MRI.  Following discussion, plan monitoring.  May do icing, over the counter medication if needed.  May use compression for comfort.  Limit time on feet for comfort. Letter provided, focus on seated work on return to work.  Defer MRI currently. Can reconsider depending on course.  Follow-up 7-10 days for recheck, sooner if needed.    If you have any further questions for your physician or physician s care team you can contact them thru [employer] or by calling [phone].      Prince Gonsales DO  [facility]    -----  Chief Complaint   Patient presents with     Right Knee - Pain, Injury       SUBJECTIVE  Lance Disla is a/an 29 year old female who is seen  "as an Urgent Care referral for evaluation of right knee.      injury    Onset: 1/27/24. Patient describes injury as sitting in her chair when it starting sliding, as she tried to catch herself,  her knee twisted, varus   Location of Pain: right knee, medial jointline  Worsened by: Weightbearing,extension  Better with:   Treatments tried: Ace bandage from  and tiger balm  Associated symptoms: swelling, Cracking    Orthopedic/Surgical history: NO  Social History/Occupation: [employer]    **  Above information per rooming staff.  Additional history:  Sat down in chair, to stop chair from sliding used right LE; noted a crack sensation in knee. Describes more of a varus and twisting stress. Pain has been medial knee.  + swelling, no bruising.  Some limp.        REVIEW OF SYSTEMS:  Review of Systems    OBJECTIVE:  Ht 1.626 m (5' 4\")   Wt 75.3 kg (166 lb)   BMI 28.49 kg/m           Right Knee exam    Inspection:   mild effusion, appearance of fullness/swelling   no ecchymosis    ROM:      Flexion >100-110 degrees       Extension actively 5-10 degrees, passively full       Range of motion limited by pain    Patellar Motion:      Normal patellar tracking noted through range of motion  No pain with patellar translation    Tender:      medial joint line       Proximal MCL    Special Tests:      mild pain with Luis       neg (-) Lachman       neg (-) anterior drawer       neg (-) posterior drawer       neg (-) varus       neg (-) valgus for laxity, min pain       + medial pain with forced extension        RADIOLOGY:  Final results and radiologist's interpretation, available in the [facility] health record.  Images were reviewed with the patient in the office today.  My personal interpretation of the performed imaging: no fracture noted. Joint spaces preserved.        EXAM: XR KNEE RIGHT 3 VIEWS  [facility]  DATE[facility] 1/27/2025     INDICATION:  Acute pain of right knee  COMPARISON: None.   "                                                                    IMPRESSION: Normal joint spaces and alignment. No fracture or joint effusion.            Review of prior external note(s) from -   Review of the result(s) of each unique test - imaging  Independent interpretation of a test performed by another physician/other qualified health care professional (not separately reported) - imaging             Again, thank you for allowing me to participate in the care of your patient.        Sincerely,        Prince Gonsales, DO    Electronically signed

## 2025-01-29 NOTE — LETTER
January 29, 2025      Lance Disla  [address]  [facility] [address]        To Whom It May Concern:    Lance Disla was seen in our clinic for evaluation of injury.   Advise remaining off work through Sunday, 2/2/25.  On Monday, 2/3/25, she may return to work with the following: limited to light duty - seated work only.  May stand/walk for breaks, getting into/out of work as needed.      Sincerely,          Prince Gonsales

## (undated) DEVICE — DRSG TELFA 3X8" 1238

## (undated) DEVICE — GLOVE SURGICAL SIZE 6 GAMMAX NON-LATEX 8512A

## (undated) DEVICE — ESU CLEANER TIP 31142717

## (undated) DEVICE — DRAPE GYN/UROLOGY FLUID POUCH TUR 29455

## (undated) DEVICE — SU SILK 2-0 SH 30" K833H

## (undated) DEVICE — PREP SKIN SCRUB TRAY 4461A

## (undated) DEVICE — SWAB RAYON 8" 1 TIP LG STERILE 34-7022-50

## (undated) DEVICE — ESU PENCIL W/HOLSTER

## (undated) DEVICE — PAD PERI W/WINGS 1580A

## (undated) DEVICE — NDL SPINAL 22GA 3.5" QUINCKE 405181

## (undated) DEVICE — PACK MINOR SBA15MIFSE

## (undated) DEVICE — ESU GROUND PAD ADULT W/CORD E7507

## (undated) DEVICE — SU VICRYL 0 CT-1 27" UND J260H

## (undated) DEVICE — ESU ELEC LEEP LOOP 20X12MM WHITE DLP-W11

## (undated) DEVICE — NDL 19GA 1.5"

## (undated) DEVICE — SOL WATER IRRIG 1000ML BOTTLE 07139-09

## (undated) RX ORDER — ONDANSETRON 2 MG/ML
INJECTION INTRAMUSCULAR; INTRAVENOUS
Status: DISPENSED
Start: 2024-04-18

## (undated) RX ORDER — IODINE SOLUTION STRONG 5% (LUGOL'S) 5 %
SOLUTION ORAL
Status: DISPENSED
Start: 2024-04-18

## (undated) RX ORDER — FERRIC SUBSULFATE 0.21 G/G
LIQUID TOPICAL
Status: DISPENSED
Start: 2024-04-18

## (undated) RX ORDER — PROPOFOL 10 MG/ML
INJECTION, EMULSION INTRAVENOUS
Status: DISPENSED
Start: 2024-04-18

## (undated) RX ORDER — ACETAMINOPHEN 325 MG/1
TABLET ORAL
Status: DISPENSED
Start: 2024-04-18

## (undated) RX ORDER — KETOROLAC TROMETHAMINE 30 MG/ML
INJECTION, SOLUTION INTRAMUSCULAR; INTRAVENOUS
Status: DISPENSED
Start: 2024-04-18

## (undated) RX ORDER — FENTANYL CITRATE 50 UG/ML
INJECTION, SOLUTION INTRAMUSCULAR; INTRAVENOUS
Status: DISPENSED
Start: 2024-04-18